# Patient Record
Sex: FEMALE | Race: WHITE | NOT HISPANIC OR LATINO | URBAN - METROPOLITAN AREA
[De-identification: names, ages, dates, MRNs, and addresses within clinical notes are randomized per-mention and may not be internally consistent; named-entity substitution may affect disease eponyms.]

---

## 2017-01-11 PROBLEM — Z00.00 ENCOUNTER FOR PREVENTIVE HEALTH EXAMINATION: Status: ACTIVE | Noted: 2017-01-11

## 2017-01-12 ENCOUNTER — OUTPATIENT (OUTPATIENT)
Dept: OUTPATIENT SERVICES | Facility: HOSPITAL | Age: 52
LOS: 1 days | Discharge: HOME | End: 2017-01-12

## 2017-01-12 ENCOUNTER — APPOINTMENT (OUTPATIENT)
Dept: HEMATOLOGY ONCOLOGY | Facility: CLINIC | Age: 52
End: 2017-01-12

## 2017-01-12 VITALS
BODY MASS INDEX: 32.58 KG/M2 | HEART RATE: 89 BPM | HEIGHT: 68 IN | TEMPERATURE: 98.2 F | WEIGHT: 215 LBS | RESPIRATION RATE: 16 BRPM | SYSTOLIC BLOOD PRESSURE: 137 MMHG | DIASTOLIC BLOOD PRESSURE: 64 MMHG

## 2017-01-12 DIAGNOSIS — Z86.39 PERSONAL HISTORY OF OTHER ENDOCRINE, NUTRITIONAL AND METABOLIC DISEASE: ICD-10-CM

## 2017-01-12 DIAGNOSIS — Z80.51 FAMILY HISTORY OF MALIGNANT NEOPLASM OF KIDNEY: ICD-10-CM

## 2017-01-12 DIAGNOSIS — Z80.8 FAMILY HISTORY OF MALIGNANT NEOPLASM OF OTHER ORGANS OR SYSTEMS: ICD-10-CM

## 2017-01-12 DIAGNOSIS — Z80.3 FAMILY HISTORY OF MALIGNANT NEOPLASM OF BREAST: ICD-10-CM

## 2017-01-12 DIAGNOSIS — Z87.19 PERSONAL HISTORY OF OTHER DISEASES OF THE DIGESTIVE SYSTEM: ICD-10-CM

## 2017-01-12 DIAGNOSIS — Z87.39 PERSONAL HISTORY OF OTHER DISEASES OF THE MUSCULOSKELETAL SYSTEM AND CONNECTIVE TISSUE: ICD-10-CM

## 2017-01-16 ENCOUNTER — OUTPATIENT (OUTPATIENT)
Dept: OUTPATIENT SERVICES | Facility: HOSPITAL | Age: 52
LOS: 1 days | Discharge: HOME | End: 2017-01-16

## 2017-01-24 LAB
ALBUMIN MFR SERPL ELPH: 57.5 %
ALBUMIN SERPL ELPH-MCNC: 4.6 G/DL
ALBUMIN SERPL-MCNC: 4.4 G/DL
ALBUMIN/GLOB SERPL ELPH: 1.4 ZZ
ALBUMIN/GLOB SERPL: 1.47
ALP SERPL-CCNC: 128 IU/L
ALPHA1 GLOB MFR SERPL ELPH: 3.3 %
ALPHA1 GLOB SERPL ELPH-MCNC: 0.3 G/DL
ALPHA2 GLOB MFR SERPL ELPH: 11.5 %
ALPHA2 GLOB SERPL ELPH-MCNC: 0.9 G/DL
ALT SERPL-CCNC: 46 IU/L
ANION GAP SERPL CALC-SCNC: 11 MEQ/L
AST SERPL-CCNC: 33 IU/L
B-GLOBULIN SERPL ELPH-MCNC: 0.9 G/DL
B2 MICROGLOB SERPL-MCNC: 2.2 MG/L
BASOPHILS # BLD: 0.08 TH/MM3
BASOPHILS NFR BLD: 0.6 %
BETA1 GLOB MFR SERPL ELPH: 11.1 %
BILIRUB SERPL-MCNC: 0.4 MG/DL
BUN SERPL-MCNC: 9 MG/DL
BUN/CREAT SERPL: 11.4 %
CALCIUM SERPL-MCNC: 9.6 MG/DL
CHLORIDE SERPL-SCNC: 102 MEQ/L
CO2 SERPL-SCNC: 28 MEQ/L
CREAT SERPL-MCNC: 0.79 MG/DL
EOSINOPHIL # BLD: 0.56 TH/MM3
EOSINOPHIL NFR BLD: 4.2 %
ERYTHROCYTE [DISTWIDTH] IN BLOOD BY AUTOMATED COUNT: 13.5 %
GAMMA GLOB MFR SERPL ELPH: 16.6 %
GAMMA GLOB SERPL ELPH-MCNC: 1.3 G/DL
GFR SERPL CREATININE-BSD FRML MDRD: 77
GLUCOSE SERPL-MCNC: 71 MG/DL
GRANULOCYTES # BLD: 5.13 TH/MM3
GRANULOCYTES NFR BLD: 38 %
HCT VFR BLD AUTO: 42.3 %
HGB BLD-MCNC: 13.9 G/DL
IGA FLD-MCNC: 240 MG/DL
IGG FLD-MCNC: 1450 MG/DL
IGM SERPL-MCNC: 64 MG/DL
IMM GRANULOCYTES # BLD: 0.08 TH/MM3
IMM GRANULOCYTES NFR BLD: 0.6 %
INTERPRETATION SERPL IFE-IMP: NORMAL
LACTATE DEHYDROGENASE (NORTH): 242 IU/L
LYMPHOCYTES # BLD: 6.89 TH/MM3
LYMPHOCYTES NFR BLD: 51.2 %
MCH RBC QN AUTO: 30.2 PG
MCHC RBC AUTO-ENTMCNC: 32.9 G/DL
MCV RBC AUTO: 92 FL
MONOCYTES # BLD: 0.73 TH/MM3
MONOCYTES NFR BLD: 5.4 %
PLATELET # BLD: 583 TH/MM3
PMV BLD AUTO: 9 FL
POTASSIUM SERPL-SCNC: 4.8 MMOL/L
PROT PATTERN SERPL ELPH-IMP: 8 G/DL
PROT PATTERN SERPL ELPH-IMP: NORMAL
PROT SERPL-MCNC: 7.4 G/DL
PROT SERPL-MCNC: 8 G/DL
RBC # BLD AUTO: 4.6 MIL/MM3
SODIUM SERPL-SCNC: 141 MEQ/L
WBC # BLD: 13.47 TH/MM3

## 2017-06-27 DIAGNOSIS — R10.2 PELVIC AND PERINEAL PAIN: ICD-10-CM

## 2017-06-29 ENCOUNTER — OUTPATIENT (OUTPATIENT)
Dept: OUTPATIENT SERVICES | Facility: HOSPITAL | Age: 52
LOS: 1 days | Discharge: HOME | End: 2017-06-29

## 2017-06-29 ENCOUNTER — APPOINTMENT (OUTPATIENT)
Dept: HEMATOLOGY ONCOLOGY | Facility: CLINIC | Age: 52
End: 2017-06-29

## 2017-06-29 VITALS
RESPIRATION RATE: 16 BRPM | HEART RATE: 68 BPM | WEIGHT: 200 LBS | SYSTOLIC BLOOD PRESSURE: 109 MMHG | DIASTOLIC BLOOD PRESSURE: 62 MMHG | HEIGHT: 68 IN | TEMPERATURE: 98.6 F | BODY MASS INDEX: 30.31 KG/M2

## 2017-06-30 DIAGNOSIS — C91.10 CHRONIC LYMPHOCYTIC LEUKEMIA OF B-CELL TYPE NOT HAVING ACHIEVED REMISSION: ICD-10-CM

## 2017-07-13 LAB
ANA TITR SER: NEGATIVE
BASOPHILS # BLD: 0.05 TH/MM3
BASOPHILS NFR BLD: 0.4 %
EOSINOPHIL # BLD: 0.4 TH/MM3
EOSINOPHIL NFR BLD: 2.9 %
ERYTHROCYTE [DISTWIDTH] IN BLOOD BY AUTOMATED COUNT: 12.9 %
FERRITIN SERPL-MCNC: 46 NG/ML
GRANULOCYTES # BLD: 5.26 TH/MM3
GRANULOCYTES NFR BLD: 37.5 %
HCT VFR BLD AUTO: 39.3 %
HGB BLD-MCNC: 12.8 G/DL
IMM GRANULOCYTES # BLD: 0.02 TH/MM3
IMM GRANULOCYTES NFR BLD: 0.1 %
IRON SERPL-MCNC: 50 UG/DL
JAK2 GENE MUT ANL BLD/T: NORMAL
LYMPHOCYTES # BLD: 7.56 TH/MM3
LYMPHOCYTES NFR BLD: 54 %
MCH RBC QN AUTO: 30 PG
MCHC RBC AUTO-ENTMCNC: 32.6 G/DL
MCV RBC AUTO: 92 FL
MONOCYTES # BLD: 0.72 TH/MM3
MONOCYTES NFR BLD: 5.1 %
PLATELET # BLD: 445 TH/MM3
PMV BLD AUTO: 8.6 FL
RBC # BLD AUTO: 4.27 MIL/MM3
RHEUMATOID FACT SERPL-ACNC: 79 IU/ML
TIBC SERPL-MCNC: 381 UG/DL
WBC # BLD: 14.01 TH/MM3

## 2017-09-06 ENCOUNTER — TRANSCRIPTION ENCOUNTER (OUTPATIENT)
Age: 52
End: 2017-09-06

## 2017-12-21 ENCOUNTER — APPOINTMENT (OUTPATIENT)
Dept: HEMATOLOGY ONCOLOGY | Facility: CLINIC | Age: 52
End: 2017-12-21

## 2017-12-21 ENCOUNTER — OUTPATIENT (OUTPATIENT)
Dept: OUTPATIENT SERVICES | Facility: HOSPITAL | Age: 52
LOS: 1 days | Discharge: HOME | End: 2017-12-21

## 2017-12-21 VITALS
HEART RATE: 74 BPM | RESPIRATION RATE: 18 BRPM | TEMPERATURE: 97.2 F | WEIGHT: 209 LBS | SYSTOLIC BLOOD PRESSURE: 126 MMHG | BODY MASS INDEX: 31.67 KG/M2 | DIASTOLIC BLOOD PRESSURE: 67 MMHG | HEIGHT: 68 IN

## 2017-12-22 DIAGNOSIS — C91.10 CHRONIC LYMPHOCYTIC LEUKEMIA OF B-CELL TYPE NOT HAVING ACHIEVED REMISSION: ICD-10-CM

## 2018-04-30 ENCOUNTER — OUTPATIENT (OUTPATIENT)
Dept: OUTPATIENT SERVICES | Facility: HOSPITAL | Age: 53
LOS: 1 days | Discharge: HOME | End: 2018-04-30

## 2018-04-30 ENCOUNTER — APPOINTMENT (OUTPATIENT)
Dept: HEMATOLOGY ONCOLOGY | Facility: CLINIC | Age: 53
End: 2018-04-30

## 2018-04-30 VITALS
TEMPERATURE: 97 F | WEIGHT: 212 LBS | RESPIRATION RATE: 16 BRPM | BODY MASS INDEX: 32.13 KG/M2 | DIASTOLIC BLOOD PRESSURE: 63 MMHG | SYSTOLIC BLOOD PRESSURE: 132 MMHG | HEART RATE: 78 BPM | HEIGHT: 68 IN

## 2018-05-23 DIAGNOSIS — C91.10 CHRONIC LYMPHOCYTIC LEUKEMIA OF B-CELL TYPE NOT HAVING ACHIEVED REMISSION: ICD-10-CM

## 2018-10-26 ENCOUNTER — OUTPATIENT (OUTPATIENT)
Dept: OUTPATIENT SERVICES | Facility: HOSPITAL | Age: 53
LOS: 1 days | Discharge: HOME | End: 2018-10-26

## 2018-10-26 ENCOUNTER — APPOINTMENT (OUTPATIENT)
Dept: HEMATOLOGY ONCOLOGY | Facility: CLINIC | Age: 53
End: 2018-10-26

## 2018-10-26 VITALS
TEMPERATURE: 96.7 F | DIASTOLIC BLOOD PRESSURE: 50 MMHG | BODY MASS INDEX: 32.13 KG/M2 | RESPIRATION RATE: 16 BRPM | HEART RATE: 70 BPM | WEIGHT: 212 LBS | SYSTOLIC BLOOD PRESSURE: 124 MMHG | HEIGHT: 68 IN

## 2018-10-29 DIAGNOSIS — C91.10 CHRONIC LYMPHOCYTIC LEUKEMIA OF B-CELL TYPE NOT HAVING ACHIEVED REMISSION: ICD-10-CM

## 2018-12-25 ENCOUNTER — EMERGENCY (EMERGENCY)
Facility: HOSPITAL | Age: 53
LOS: 0 days | Discharge: HOME | End: 2018-12-25
Attending: EMERGENCY MEDICINE | Admitting: EMERGENCY MEDICINE

## 2018-12-25 VITALS
OXYGEN SATURATION: 97 % | DIASTOLIC BLOOD PRESSURE: 68 MMHG | TEMPERATURE: 98 F | RESPIRATION RATE: 20 BRPM | HEART RATE: 88 BPM | SYSTOLIC BLOOD PRESSURE: 136 MMHG

## 2018-12-25 VITALS
HEART RATE: 68 BPM | SYSTOLIC BLOOD PRESSURE: 121 MMHG | OXYGEN SATURATION: 98 % | RESPIRATION RATE: 18 BRPM | TEMPERATURE: 99 F | DIASTOLIC BLOOD PRESSURE: 58 MMHG

## 2018-12-25 DIAGNOSIS — Z90.49 ACQUIRED ABSENCE OF OTHER SPECIFIED PARTS OF DIGESTIVE TRACT: ICD-10-CM

## 2018-12-25 DIAGNOSIS — R10.9 UNSPECIFIED ABDOMINAL PAIN: ICD-10-CM

## 2018-12-25 DIAGNOSIS — Z90.49 ACQUIRED ABSENCE OF OTHER SPECIFIED PARTS OF DIGESTIVE TRACT: Chronic | ICD-10-CM

## 2018-12-25 DIAGNOSIS — M54.9 DORSALGIA, UNSPECIFIED: ICD-10-CM

## 2018-12-25 LAB
ALBUMIN SERPL ELPH-MCNC: 4.5 G/DL — SIGNIFICANT CHANGE UP (ref 3.5–5.2)
ALP SERPL-CCNC: 125 U/L — HIGH (ref 30–115)
ALT FLD-CCNC: 45 U/L — HIGH (ref 0–41)
ANION GAP SERPL CALC-SCNC: 16 MMOL/L — HIGH (ref 7–14)
APPEARANCE UR: CLEAR — SIGNIFICANT CHANGE UP
AST SERPL-CCNC: 25 U/L — SIGNIFICANT CHANGE UP (ref 0–41)
BACTERIA # UR AUTO: ABNORMAL /HPF
BASOPHILS # BLD AUTO: 0 K/UL — SIGNIFICANT CHANGE UP (ref 0–0.2)
BASOPHILS NFR BLD AUTO: 0 % — SIGNIFICANT CHANGE UP (ref 0–1)
BILIRUB SERPL-MCNC: 0.6 MG/DL — SIGNIFICANT CHANGE UP (ref 0.2–1.2)
BILIRUB UR-MCNC: NEGATIVE — SIGNIFICANT CHANGE UP
BUN SERPL-MCNC: 10 MG/DL — SIGNIFICANT CHANGE UP (ref 10–20)
CALCIUM SERPL-MCNC: 9.5 MG/DL — SIGNIFICANT CHANGE UP (ref 8.5–10.1)
CHLORIDE SERPL-SCNC: 101 MMOL/L — SIGNIFICANT CHANGE UP (ref 98–110)
CO2 SERPL-SCNC: 23 MMOL/L — SIGNIFICANT CHANGE UP (ref 17–32)
COLOR SPEC: YELLOW — SIGNIFICANT CHANGE UP
CREAT SERPL-MCNC: 0.9 MG/DL — SIGNIFICANT CHANGE UP (ref 0.7–1.5)
D DIMER BLD IA.RAPID-MCNC: 201 NG/ML DDU — SIGNIFICANT CHANGE UP (ref 0–230)
DIFF PNL FLD: NEGATIVE — SIGNIFICANT CHANGE UP
EOSINOPHIL # BLD AUTO: 0.19 K/UL — SIGNIFICANT CHANGE UP (ref 0–0.7)
EOSINOPHIL NFR BLD AUTO: 1 % — SIGNIFICANT CHANGE UP (ref 0–8)
EPI CELLS # UR: ABNORMAL /HPF
GLUCOSE SERPL-MCNC: 96 MG/DL — SIGNIFICANT CHANGE UP (ref 70–99)
GLUCOSE UR QL: NEGATIVE MG/DL — SIGNIFICANT CHANGE UP
HCT VFR BLD CALC: 40 % — SIGNIFICANT CHANGE UP (ref 37–47)
HGB BLD-MCNC: 13.2 G/DL — SIGNIFICANT CHANGE UP (ref 12–16)
KETONES UR-MCNC: NEGATIVE — SIGNIFICANT CHANGE UP
LACTATE SERPL-SCNC: 1.4 MMOL/L — SIGNIFICANT CHANGE UP (ref 0.5–2.2)
LEUKOCYTE ESTERASE UR-ACNC: ABNORMAL
LYMPHOCYTES # BLD AUTO: 11.47 K/UL — HIGH (ref 1.2–3.4)
LYMPHOCYTES # BLD AUTO: 60 % — HIGH (ref 20.5–51.1)
MCHC RBC-ENTMCNC: 29.8 PG — SIGNIFICANT CHANGE UP (ref 27–31)
MCHC RBC-ENTMCNC: 33 G/DL — SIGNIFICANT CHANGE UP (ref 32–37)
MCV RBC AUTO: 90.3 FL — SIGNIFICANT CHANGE UP (ref 81–99)
MONOCYTES # BLD AUTO: 0.57 K/UL — SIGNIFICANT CHANGE UP (ref 0.1–0.6)
MONOCYTES NFR BLD AUTO: 3 % — SIGNIFICANT CHANGE UP (ref 1.7–9.3)
NEUTROPHILS # BLD AUTO: 6.88 K/UL — HIGH (ref 1.4–6.5)
NEUTROPHILS NFR BLD AUTO: 35 % — LOW (ref 42.2–75.2)
NEUTS BAND # BLD: 1 % — SIGNIFICANT CHANGE UP (ref 0–6)
NITRITE UR-MCNC: NEGATIVE — SIGNIFICANT CHANGE UP
NRBC # BLD: 0 /100 — SIGNIFICANT CHANGE UP (ref 0–0)
NRBC # BLD: SIGNIFICANT CHANGE UP /100 WBCS (ref 0–0)
PH UR: 8.5 — SIGNIFICANT CHANGE UP (ref 5–8)
PLAT MORPH BLD: NORMAL — SIGNIFICANT CHANGE UP
PLATELET # BLD AUTO: 381 K/UL — SIGNIFICANT CHANGE UP (ref 130–400)
POTASSIUM SERPL-MCNC: 4.8 MMOL/L — SIGNIFICANT CHANGE UP (ref 3.5–5)
POTASSIUM SERPL-SCNC: 4.8 MMOL/L — SIGNIFICANT CHANGE UP (ref 3.5–5)
PROT SERPL-MCNC: 7.6 G/DL — SIGNIFICANT CHANGE UP (ref 6–8)
PROT UR-MCNC: NEGATIVE MG/DL — SIGNIFICANT CHANGE UP
RBC # BLD: 4.43 M/UL — SIGNIFICANT CHANGE UP (ref 4.2–5.4)
RBC # FLD: 12.9 % — SIGNIFICANT CHANGE UP (ref 11.5–14.5)
RBC BLD AUTO: NORMAL — SIGNIFICANT CHANGE UP
RBC CASTS # UR COMP ASSIST: SIGNIFICANT CHANGE UP /HPF
SODIUM SERPL-SCNC: 140 MMOL/L — SIGNIFICANT CHANGE UP (ref 135–146)
SP GR SPEC: 1.01 — SIGNIFICANT CHANGE UP (ref 1.01–1.03)
UROBILINOGEN FLD QL: 0.2 MG/DL — SIGNIFICANT CHANGE UP (ref 0.2–0.2)
WBC # BLD: 19.11 K/UL — HIGH (ref 4.8–10.8)
WBC # FLD AUTO: 19.11 K/UL — HIGH (ref 4.8–10.8)
WBC UR QL: SIGNIFICANT CHANGE UP /HPF

## 2018-12-25 RX ORDER — SODIUM CHLORIDE 9 MG/ML
1000 INJECTION INTRAMUSCULAR; INTRAVENOUS; SUBCUTANEOUS ONCE
Qty: 0 | Refills: 0 | Status: COMPLETED | OUTPATIENT
Start: 2018-12-25 | End: 2018-12-25

## 2018-12-25 RX ORDER — MORPHINE SULFATE 50 MG/1
4 CAPSULE, EXTENDED RELEASE ORAL ONCE
Qty: 0 | Refills: 0 | Status: DISCONTINUED | OUTPATIENT
Start: 2018-12-25 | End: 2018-12-25

## 2018-12-25 RX ORDER — METHOCARBAMOL 500 MG/1
2 TABLET, FILM COATED ORAL
Qty: 30 | Refills: 0 | OUTPATIENT
Start: 2018-12-25 | End: 2018-12-29

## 2018-12-25 RX ORDER — KETOROLAC TROMETHAMINE 30 MG/ML
30 SYRINGE (ML) INJECTION ONCE
Qty: 0 | Refills: 0 | Status: DISCONTINUED | OUTPATIENT
Start: 2018-12-25 | End: 2018-12-25

## 2018-12-25 RX ADMIN — SODIUM CHLORIDE 2400 MILLILITER(S): 9 INJECTION INTRAMUSCULAR; INTRAVENOUS; SUBCUTANEOUS at 13:29

## 2018-12-25 RX ADMIN — MORPHINE SULFATE 4 MILLIGRAM(S): 50 CAPSULE, EXTENDED RELEASE ORAL at 13:50

## 2018-12-25 RX ADMIN — Medication 30 MILLIGRAM(S): at 16:47

## 2018-12-25 RX ADMIN — Medication 30 MILLIGRAM(S): at 17:00

## 2018-12-25 RX ADMIN — MORPHINE SULFATE 4 MILLIGRAM(S): 50 CAPSULE, EXTENDED RELEASE ORAL at 14:54

## 2018-12-25 RX ADMIN — MORPHINE SULFATE 4 MILLIGRAM(S): 50 CAPSULE, EXTENDED RELEASE ORAL at 15:15

## 2018-12-25 RX ADMIN — MORPHINE SULFATE 4 MILLIGRAM(S): 50 CAPSULE, EXTENDED RELEASE ORAL at 13:29

## 2018-12-25 NOTE — ED ADULT NURSE NOTE - NSIMPLEMENTINTERV_GEN_ALL_ED
Implemented All Universal Safety Interventions:  [place] to call system. Call bell, personal items and telephone within reach. Instruct patient to call for assistance. Room bathroom lighting operational. Non-slip footwear when patient is off stretcher. Physically safe environment: no spills, clutter or unnecessary equipment. Stretcher in lowest position, wheels locked, appropriate side rails in place.

## 2018-12-25 NOTE — ED PROVIDER NOTE - NS ED ROS FT
Constitutional: See HPI. No fever/chills.  Neck: No neck pain or stiffness.  Cardiac: No chest pain, SOB or edema. No chest pain with exertion.  Respiratory: No cough or respiratory distress. No hemoptysis.   GI: No nausea, vomiting, diarrhea or abdominal pain.  : No dysuria, frequency or burning.   MS: No myalgia, muscle weakness, joint pain. (+) back pain.  Neuro: No headache or weakness. No LOC.  Skin: No rash.  Except as documented in the HPI, all other systems are negative.

## 2018-12-25 NOTE — ED PROVIDER NOTE - OBJECTIVE STATEMENT
52 y/o F with PMH of CLL and cholecystectomy , presents c/o R sided back pain x 2 days. Back pain is constant, pt feels a burning sensation. Denies any urinary SX. Has not noticed a color change in urine. Pt took Motrin last night that provided temporary relief. Pt has also been using heat packs with minimal relief. recently received a flu shot.

## 2018-12-25 NOTE — ED PROVIDER NOTE - PHYSICAL EXAMINATION
CON: ao x 3, uncomftorable appearing,  HENMT: clear oropharynx,  neck supple,  CV: rrr, equal pulses b/l, RESP: cta b/l, GI:  soft, nontender, no rebound, no guarding, SKIN: no rash, MSK: neg SLR  BL, pain worse with mvt, + right cva ttp NEURO: no gross motor or sensory deficit Psychiatric: appropriate mood, appropriate affect

## 2018-12-25 NOTE — ED ADULT NURSE NOTE - CHPI ED NUR SYMPTOMS NEG
no fever/no abdominal distension/no blood in stool/no chills/no dysuria/no nausea/no hematuria/no diarrhea

## 2018-12-25 NOTE — ED PROVIDER NOTE - PROGRESS NOTE DETAILS
Labs reviewed with pt. Pain is reproducible to back, now further questioning. Pain is worse with movement. WC noted to be elevated, however pt has ha PMH of CLL. Instructed to follow up with PMD for this. Will d/c home with muscle relaxer.

## 2018-12-26 LAB
CULTURE RESULTS: SIGNIFICANT CHANGE UP
SPECIMEN SOURCE: SIGNIFICANT CHANGE UP

## 2019-04-09 ENCOUNTER — TRANSCRIPTION ENCOUNTER (OUTPATIENT)
Age: 54
End: 2019-04-09

## 2019-04-26 ENCOUNTER — OUTPATIENT (OUTPATIENT)
Dept: OUTPATIENT SERVICES | Facility: HOSPITAL | Age: 54
LOS: 1 days | Discharge: HOME | End: 2019-04-26

## 2019-04-26 ENCOUNTER — APPOINTMENT (OUTPATIENT)
Dept: HEMATOLOGY ONCOLOGY | Facility: CLINIC | Age: 54
End: 2019-04-26

## 2019-04-26 VITALS
DIASTOLIC BLOOD PRESSURE: 77 MMHG | WEIGHT: 216 LBS | BODY MASS INDEX: 32.74 KG/M2 | HEART RATE: 74 BPM | TEMPERATURE: 97.5 F | HEIGHT: 68 IN | SYSTOLIC BLOOD PRESSURE: 151 MMHG

## 2019-04-26 DIAGNOSIS — Z90.49 ACQUIRED ABSENCE OF OTHER SPECIFIED PARTS OF DIGESTIVE TRACT: Chronic | ICD-10-CM

## 2019-04-26 PROBLEM — C91.90 LYMPHOID LEUKEMIA, UNSPECIFIED NOT HAVING ACHIEVED REMISSION: Chronic | Status: ACTIVE | Noted: 2018-12-25

## 2019-04-26 RX ORDER — ESOMEPRAZOLE MAGNESIUM 40 MG/1
40 CAPSULE, DELAYED RELEASE ORAL
Refills: 0 | Status: ACTIVE | COMMUNITY

## 2019-04-26 RX ORDER — LEVOTHYROXINE SODIUM 0.14 MG/1
137 TABLET ORAL
Refills: 0 | Status: ACTIVE | COMMUNITY

## 2019-04-27 NOTE — HISTORY OF PRESENT ILLNESS
[de-identified] : 52-year-old female is here today for 6 months f/u visit. She has chronic lymphocytic leukemia. She saw Dr. Wesly Barr and had a blood workup in 12/2016.  CBC shows WBC 13.4 with 54% lymphocytes and absolute lymphocyte counts of 7276.  Hemoglobin was 13.3 g/dL and platelet counts 431. Her electrolytes and renal function were within normal limits.  On 12/29/16, flow cytometry of peripheral blood performed at [facility] lab revealed small mature lymphocytes positive for CD5, CD19, CD20 (dim), CD23, HLL-DR, CD11c and surface lambda light chain restriction (dim signal). These B cells were negative for CD10 and CD38. 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  On January 4, 2016, she [facility] of the abdomen and [facility], which showed fatty liver. The spleen was not enlarged. The patient has no fever, night sweats, weight loss, or poor appetite.\par \par Blood work performed during her last visit showed mild lymphocytosis, normal Hb and elevated PLT > 500. SPEP/OUSMANE was negative for monoclonal protein. Quantitative IgG, IgA and IgM were within normal ranges. \par \par Today, she report she is feeling better , no longer on Prednisone and  Methotrexate  patient had rash with Methotrexate . On 3/20/18 patient had Ct of Abdomen and pelvis which reveal small Lymph node in the region of the celiac axis and also retroperitoneal area . largest lymph node measure 14 mm on the left and 11 mm on the right.On 3/27/18 patient had blood work we reviewed all blood result .  \par Also patient report mild right side back pain , she will follow up with PMD . Patient ambulating fully independent \par \par Review of systems is otherwise negative.\par \par \par \par \par \par \par \par \par \par \par \par Review of systems is otherwise negative [de-identified] : 10/26/18;\par The patient is here for followup visit. She has CLL and has been kept on observation. She feels fatigue but no fever, night sweat or weight loss. She had blood work on 10/19/18. CBC showed stable lymphocytosis.\par \par 4/26/19: \par Pt returns for f/u visit. No fresh complaints. Denies having fever, night sweats, weight loss, pruritus or LUQ pain. No new lumps or masses. Her CBC showed mild increase in WBC from 16K to 18K. Lymphocytic count increased from 10K to 13K. HB and plts wnl.

## 2019-04-27 NOTE — ASSESSMENT
[FreeTextEntry1] : 1. Chronic lymphocytic leukemia with mild lymphocytosis, asymptomatic.\par 2. Thrombocytosis, reactive.\par \par Recommendation: \par - Repeat blood work reviewed with the patient. Her CBC showed mild increase in WBC from 16K to 18K. Lymphocytic count increased from 10K to 13K. HB and plts wnl.\par - Continue observation.\par - Followup visit here in 6 months.

## 2019-04-27 NOTE — PHYSICAL EXAM
[Fully active, able to carry on all pre-disease performance without restriction] : Status 0 - Fully active, able to carry on all pre-disease performance without restriction [Normal] : affect appropriate [de-identified] : There is no palpable cervical lymphadenopathy.

## 2019-04-30 DIAGNOSIS — C91.10 CHRONIC LYMPHOCYTIC LEUKEMIA OF B-CELL TYPE NOT HAVING ACHIEVED REMISSION: ICD-10-CM

## 2019-06-13 ENCOUNTER — EMERGENCY (EMERGENCY)
Facility: HOSPITAL | Age: 54
LOS: 0 days | Discharge: HOME | End: 2019-06-13
Admitting: EMERGENCY MEDICINE
Payer: COMMERCIAL

## 2019-06-13 VITALS
DIASTOLIC BLOOD PRESSURE: 77 MMHG | OXYGEN SATURATION: 97 % | RESPIRATION RATE: 20 BRPM | HEART RATE: 67 BPM | TEMPERATURE: 97 F | SYSTOLIC BLOOD PRESSURE: 163 MMHG

## 2019-06-13 VITALS — HEIGHT: 67 IN | WEIGHT: 220.02 LBS

## 2019-06-13 DIAGNOSIS — Z88.0 ALLERGY STATUS TO PENICILLIN: ICD-10-CM

## 2019-06-13 DIAGNOSIS — Z90.49 ACQUIRED ABSENCE OF OTHER SPECIFIED PARTS OF DIGESTIVE TRACT: Chronic | ICD-10-CM

## 2019-06-13 DIAGNOSIS — K08.89 OTHER SPECIFIED DISORDERS OF TEETH AND SUPPORTING STRUCTURES: ICD-10-CM

## 2019-06-13 DIAGNOSIS — Z79.899 OTHER LONG TERM (CURRENT) DRUG THERAPY: ICD-10-CM

## 2019-06-13 PROCEDURE — 99283 EMERGENCY DEPT VISIT LOW MDM: CPT

## 2019-06-13 RX ADMIN — Medication 300 MILLIGRAM(S): at 20:52

## 2019-06-13 NOTE — ED PROVIDER NOTE - ENMT, MLM
Airway patent, Nasal mucosa clear. Mouth with normal mucosa. Throat has no vesicles, no oropharyngeal exudates and uvula is midline. (+) tenderness percussion tooth #12

## 2019-06-13 NOTE — ED ADULT NURSE NOTE - OBJECTIVE STATEMENT
Pt reports left side dental pain. Denies nausea, vomiting, and fever. Pt report takin ibuprofen and tylenol for pain control

## 2019-06-13 NOTE — ED PROVIDER NOTE - OBJECTIVE STATEMENT
54 y.o. female presented to the ER c/o Left upper tooth ache since this AM.  Pt denies fever, chills, dysphagia, SOB. No other complaints. 54 y.o. female with a PMH of CLL and hypothyroidism presented to the ER c/o Left upper tooth ache since this AM.  Pt denies fever, chills, dysphagia, SOB. No other complaints.

## 2019-06-13 NOTE — ED PROVIDER NOTE - NSFOLLOWUPINSTRUCTIONS_ED_ALL_ED_FT
Dental Pain  ImageDental pain may be caused by many things, including:  Tooth decay (cavities or caries).  Infection.  The inner part of the tooth being filled with pus (abscess).  Injury.  Sometimes the cause of pain is unknown.    Your pain can vary. It may be mild or severe. You may have it all the time, or it may occur only when you are:  Chewing.  Exposed to hot or cold temperature.  Eating or drinking sugary foods or beverages, such as soda or candy.  Follow these instructions at home:  Medicines     Take over-the-counter and prescription medicines only as told by your doctor.  If you were prescribed an antibiotic medicine, take it as told by your doctor. Do not stop taking the medicine even if you start to feel better.  Eating and drinking     Do not eat foods or drinks that cause you pain. These include:  Very hot or very cold foods or drinks.  Sweet or sugary foods or drinks.  Managing pain and swelling     Image   Gargle with a salt-water mixture 3–4 times a day. To make this, dissolve ½–1 tsp of salt in 1 cup of warm water.  If told, put ice on the painful area of your face:  Put ice in a plastic bag.   Place a towel between your skin and the bag.   Leave the ice on for 20 minutes, 2–3 times a day.  Brushing your teeth     Brush your teeth twice a day using a fluoride toothpaste.   Floss your teeth once a day.  Use a toothpaste made for sensitive teeth as told by your doctor.  Use a soft toothbrush.  General instructions     Do not apply heat to the outside of your face.  Watch your dental pain. Let your doctor know if there are any changes.  Keep all follow-up visits as told by your doctor. This is important.  Contact a doctor if:  Your pain is not relieved by medicines.  You have new symptoms.  Your symptoms get worse.  Get help right away if:  You cannot open your mouth.  You are having trouble breathing or swallowing.  You have a fever.  Your face, neck, or jaw is swollen.  Summary  Dental pain may be caused by many things, including tooth decay, injury, or infection. In some cases, the cause is not known.  Your pain may be mild or severe. You may have pain all the time, or you may have it only when you eat or drink.  Take over-the-counter and prescription medicines only as told by your doctor.  Watch your dental pain for any changes. Let your doctor know if symptoms get worse.  This information is not intended to replace advice given to you by your health care provider. Make sure you discuss any questions you have with your health care provider.    Document Released: 06/05/2009 Document Revised: 12/31/2018 Document Reviewed: 12/31/2018  [employer] Interactive Patient Education © 2019 [employer]

## 2019-11-11 ENCOUNTER — APPOINTMENT (OUTPATIENT)
Dept: HEMATOLOGY ONCOLOGY | Facility: CLINIC | Age: 54
End: 2019-11-11
Payer: COMMERCIAL

## 2019-11-11 ENCOUNTER — OUTPATIENT (OUTPATIENT)
Dept: OUTPATIENT SERVICES | Facility: HOSPITAL | Age: 54
LOS: 1 days | Discharge: HOME | End: 2019-11-11

## 2019-11-11 VITALS
HEART RATE: 66 BPM | DIASTOLIC BLOOD PRESSURE: 69 MMHG | WEIGHT: 210 LBS | HEIGHT: 68 IN | TEMPERATURE: 97.1 F | BODY MASS INDEX: 31.83 KG/M2 | SYSTOLIC BLOOD PRESSURE: 113 MMHG | RESPIRATION RATE: 14 BRPM

## 2019-11-11 DIAGNOSIS — Z90.49 ACQUIRED ABSENCE OF OTHER SPECIFIED PARTS OF DIGESTIVE TRACT: Chronic | ICD-10-CM

## 2019-11-11 PROBLEM — K21.9 GASTRO-ESOPHAGEAL REFLUX DISEASE WITHOUT ESOPHAGITIS: Chronic | Status: ACTIVE | Noted: 2019-06-13

## 2019-11-11 PROBLEM — E03.9 HYPOTHYROIDISM, UNSPECIFIED: Chronic | Status: ACTIVE | Noted: 2019-06-13

## 2019-11-11 PROCEDURE — 99213 OFFICE O/P EST LOW 20 MIN: CPT

## 2019-11-13 NOTE — ASSESSMENT
[FreeTextEntry1] : 1. Chronic lymphocytic leukemia with mild lymphocytosis, asymptomatic.\par 2. Thrombocytosis, reactive.\par \par Recommendation: \par - Repeat blood work reviewed with the patient She has stable lymphocytosis with normal Hb and PLT, and is asymptomatic. \par - Continue observation.\par -- Repeat blod work in 6 months.\par - RTC in 6 months. \par \par Case was seen and discussed with Dr. Brunson  who agreed with the assessment and plan.\par

## 2019-11-13 NOTE — PHYSICAL EXAM
[Fully active, able to carry on all pre-disease performance without restriction] : Status 0 - Fully active, able to carry on all pre-disease performance without restriction [Normal] : affect appropriate [de-identified] : There is no palpable cervical lymphadenopathy.

## 2019-11-14 DIAGNOSIS — C91.10 CHRONIC LYMPHOCYTIC LEUKEMIA OF B-CELL TYPE NOT HAVING ACHIEVED REMISSION: ICD-10-CM

## 2019-12-18 ENCOUNTER — EMERGENCY (EMERGENCY)
Facility: HOSPITAL | Age: 54
LOS: 0 days | Discharge: HOME | End: 2019-12-18
Attending: EMERGENCY MEDICINE | Admitting: EMERGENCY MEDICINE
Payer: COMMERCIAL

## 2019-12-18 VITALS — HEIGHT: 67.5 IN | WEIGHT: 210.1 LBS

## 2019-12-18 VITALS
RESPIRATION RATE: 18 BRPM | SYSTOLIC BLOOD PRESSURE: 177 MMHG | HEART RATE: 91 BPM | OXYGEN SATURATION: 100 % | DIASTOLIC BLOOD PRESSURE: 81 MMHG | TEMPERATURE: 96 F

## 2019-12-18 DIAGNOSIS — R10.13 EPIGASTRIC PAIN: ICD-10-CM

## 2019-12-18 DIAGNOSIS — R07.9 CHEST PAIN, UNSPECIFIED: ICD-10-CM

## 2019-12-18 DIAGNOSIS — R07.89 OTHER CHEST PAIN: ICD-10-CM

## 2019-12-18 DIAGNOSIS — Z88.0 ALLERGY STATUS TO PENICILLIN: ICD-10-CM

## 2019-12-18 DIAGNOSIS — R11.2 NAUSEA WITH VOMITING, UNSPECIFIED: ICD-10-CM

## 2019-12-18 DIAGNOSIS — F17.200 NICOTINE DEPENDENCE, UNSPECIFIED, UNCOMPLICATED: ICD-10-CM

## 2019-12-18 DIAGNOSIS — Z90.49 ACQUIRED ABSENCE OF OTHER SPECIFIED PARTS OF DIGESTIVE TRACT: Chronic | ICD-10-CM

## 2019-12-18 LAB
ALBUMIN SERPL ELPH-MCNC: 4.8 G/DL — SIGNIFICANT CHANGE UP (ref 3.5–5.2)
ALP SERPL-CCNC: 128 U/L — HIGH (ref 30–115)
ALT FLD-CCNC: 31 U/L — SIGNIFICANT CHANGE UP (ref 0–41)
ANION GAP SERPL CALC-SCNC: 15 MMOL/L — HIGH (ref 7–14)
APTT BLD: 27.4 SEC — SIGNIFICANT CHANGE UP (ref 27–39.2)
AST SERPL-CCNC: 23 U/L — SIGNIFICANT CHANGE UP (ref 0–41)
BASOPHILS # BLD AUTO: 0.36 K/UL — HIGH (ref 0–0.2)
BASOPHILS NFR BLD AUTO: 1.8 % — HIGH (ref 0–1)
BILIRUB DIRECT SERPL-MCNC: <0.2 MG/DL — SIGNIFICANT CHANGE UP (ref 0–0.2)
BILIRUB INDIRECT FLD-MCNC: >0.1 MG/DL — LOW (ref 0.2–1.2)
BILIRUB SERPL-MCNC: 0.3 MG/DL — SIGNIFICANT CHANGE UP (ref 0.2–1.2)
BUN SERPL-MCNC: 16 MG/DL — SIGNIFICANT CHANGE UP (ref 10–20)
CALCIUM SERPL-MCNC: 9.8 MG/DL — SIGNIFICANT CHANGE UP (ref 8.5–10.1)
CHLORIDE SERPL-SCNC: 103 MMOL/L — SIGNIFICANT CHANGE UP (ref 98–110)
CO2 SERPL-SCNC: 25 MMOL/L — SIGNIFICANT CHANGE UP (ref 17–32)
CREAT SERPL-MCNC: 0.8 MG/DL — SIGNIFICANT CHANGE UP (ref 0.7–1.5)
EOSINOPHIL # BLD AUTO: 0 K/UL — SIGNIFICANT CHANGE UP (ref 0–0.7)
EOSINOPHIL NFR BLD AUTO: 0 % — SIGNIFICANT CHANGE UP (ref 0–8)
GLUCOSE SERPL-MCNC: 86 MG/DL — SIGNIFICANT CHANGE UP (ref 70–99)
HCT VFR BLD CALC: 40.2 % — SIGNIFICANT CHANGE UP (ref 37–47)
HGB BLD-MCNC: 12.9 G/DL — SIGNIFICANT CHANGE UP (ref 12–16)
INR BLD: 0.92 RATIO — SIGNIFICANT CHANGE UP (ref 0.65–1.3)
LACTATE SERPL-SCNC: 1.1 MMOL/L — SIGNIFICANT CHANGE UP (ref 0.7–2)
LIDOCAIN IGE QN: 37 U/L — SIGNIFICANT CHANGE UP (ref 7–60)
LYMPHOCYTES # BLD AUTO: 11.59 K/UL — HIGH (ref 1.2–3.4)
LYMPHOCYTES # BLD AUTO: 57.8 % — HIGH (ref 20.5–51.1)
MANUAL SMEAR VERIFICATION: SIGNIFICANT CHANGE UP
MCHC RBC-ENTMCNC: 29.7 PG — SIGNIFICANT CHANGE UP (ref 27–31)
MCHC RBC-ENTMCNC: 32.1 G/DL — SIGNIFICANT CHANGE UP (ref 32–37)
MCV RBC AUTO: 92.6 FL — SIGNIFICANT CHANGE UP (ref 81–99)
MONOCYTES # BLD AUTO: 0.56 K/UL — SIGNIFICANT CHANGE UP (ref 0.1–0.6)
MONOCYTES NFR BLD AUTO: 2.8 % — SIGNIFICANT CHANGE UP (ref 1.7–9.3)
NEUTROPHILS # BLD AUTO: 6.62 K/UL — HIGH (ref 1.4–6.5)
NEUTROPHILS NFR BLD AUTO: 33 % — LOW (ref 42.2–75.2)
PLAT MORPH BLD: NORMAL — SIGNIFICANT CHANGE UP
PLATELET # BLD AUTO: 374 K/UL — SIGNIFICANT CHANGE UP (ref 130–400)
POTASSIUM SERPL-MCNC: 4.5 MMOL/L — SIGNIFICANT CHANGE UP (ref 3.5–5)
POTASSIUM SERPL-SCNC: 4.5 MMOL/L — SIGNIFICANT CHANGE UP (ref 3.5–5)
PROT SERPL-MCNC: 7.6 G/DL — SIGNIFICANT CHANGE UP (ref 6–8)
PROTHROM AB SERPL-ACNC: 10.6 SEC — SIGNIFICANT CHANGE UP (ref 9.95–12.87)
RBC # BLD: 4.34 M/UL — SIGNIFICANT CHANGE UP (ref 4.2–5.4)
RBC # FLD: 12.8 % — SIGNIFICANT CHANGE UP (ref 11.5–14.5)
RBC BLD AUTO: NORMAL — SIGNIFICANT CHANGE UP
SMUDGE CELLS # BLD: PRESENT — SIGNIFICANT CHANGE UP
SODIUM SERPL-SCNC: 143 MMOL/L — SIGNIFICANT CHANGE UP (ref 135–146)
TROPONIN T SERPL-MCNC: <0.01 NG/ML — SIGNIFICANT CHANGE UP
VARIANT LYMPHS # BLD: 4.6 % — SIGNIFICANT CHANGE UP (ref 0–5)
WBC # BLD: 20.05 K/UL — HIGH (ref 4.8–10.8)
WBC # FLD AUTO: 20.05 K/UL — HIGH (ref 4.8–10.8)

## 2019-12-18 PROCEDURE — 71046 X-RAY EXAM CHEST 2 VIEWS: CPT | Mod: 26

## 2019-12-18 PROCEDURE — 93010 ELECTROCARDIOGRAM REPORT: CPT

## 2019-12-18 PROCEDURE — 99284 EMERGENCY DEPT VISIT MOD MDM: CPT

## 2019-12-18 RX ORDER — ONDANSETRON 8 MG/1
4 TABLET, FILM COATED ORAL ONCE
Refills: 0 | Status: COMPLETED | OUTPATIENT
Start: 2019-12-18 | End: 2019-12-18

## 2019-12-18 RX ORDER — FAMOTIDINE 10 MG/ML
20 INJECTION INTRAVENOUS ONCE
Refills: 0 | Status: COMPLETED | OUTPATIENT
Start: 2019-12-18 | End: 2019-12-18

## 2019-12-18 RX ORDER — SODIUM CHLORIDE 9 MG/ML
1000 INJECTION INTRAMUSCULAR; INTRAVENOUS; SUBCUTANEOUS ONCE
Refills: 0 | Status: COMPLETED | OUTPATIENT
Start: 2019-12-18 | End: 2019-12-18

## 2019-12-18 RX ADMIN — SODIUM CHLORIDE 1000 MILLILITER(S): 9 INJECTION INTRAMUSCULAR; INTRAVENOUS; SUBCUTANEOUS at 04:00

## 2019-12-18 RX ADMIN — ONDANSETRON 4 MILLIGRAM(S): 8 TABLET, FILM COATED ORAL at 04:09

## 2019-12-18 RX ADMIN — FAMOTIDINE 104 MILLIGRAM(S): 10 INJECTION INTRAVENOUS at 04:00

## 2019-12-18 NOTE — ED PROVIDER NOTE - OBJECTIVE STATEMENT
55 yo F with PMHx of GERD and CLL presents to the ED c/o mild epigastric pain that radiates up into her chest and into her back. Pain woke her up from sleep a few hours ago, pt stood up and felt better. She went back to sleep but then pain woke her up again so she came to ED. She had associated nausea and one episode of NBNB vomiting. She denies other complaints. She denies aggravating/alleviating factors. Pt denies fever, chills, vomiting, diarrhea, headache, dizziness, weakness, SOB, back pain, LOC, trauma, urinary symptoms, cough, calf pain/swelling, recent travel, recent surgery.

## 2019-12-18 NOTE — ED PROVIDER NOTE - NS ED ROS FT
Review of Systems  Constitutional:  No fever, chills.  Eyes:  No visual changes, eye pain, or discharge.  ENMT:  No hearing changes, pain, or discharge. No nasal congestion, discharge, or bleeding. No throat pain, swelling, or difficulty swallowing.  Cardiac:  No palpitations, syncope, or edema. (+) chest pain  Respiratory:  No dyspnea, cough. No hemoptysis.  GI:  No diarrhea, or abdominal pain. (+) nausea, vomiting   :  No dysuria, hematuria, frequency, or burning.   MS:  No back pain.  Skin:  No skin rash, pruritis, jaundice, or lesions.  Neuro:  No headache, dizziness, loss of sensation, or focal weakness.  No change in mental status.   Endocrine: No history of thyroid disease or diabetes.

## 2019-12-18 NOTE — ED PROVIDER NOTE - PATIENT PORTAL LINK FT
You can access the FollowMyHealth Patient Portal offered by [facility] by registering at the following website: [email or web address]. By joining [employer]’s FollowMyHealth portal, you will also be able to view your health information using other applications (apps) compatible with our system.

## 2019-12-18 NOTE — ED PROVIDER NOTE - NSFOLLOWUPINSTRUCTIONS_ED_ALL_ED_FT
Chest Pain    Chest pain can be caused by many different conditions which may or may not be dangerous. Causes include heartburn, lung infections, heart attack, blood clot in lungs, skin infections, strain or damage to muscle, cartilage, or bones, etc. In addition to a history and physical examination, an electrocardiogram (ECG) or other lab tests may have been performed to determine the cause of your chest pain. Follow up with your primary care provider or with a cardiologist as instructed.     SEEK IMMEDIATE MEDICAL CARE IF YOU HAVE ANY OF THE FOLLOWING SYMPTOMS: worsening chest pain, coughing up blood, unexplained back/neck/jaw pain, severe abdominal pain, dizziness or lightheadedness, fainting, shortness of breath, sweaty or clammy skin, vomiting, or racing heart beat. These symptoms may represent a serious problem that is an emergency. Do not wait to see if the symptoms will go away. Get medical help right away. Call 911 and do not drive yourself to the hospital.      Abdominal Pain    Many things can cause abdominal pain. Many times, abdominal pain is not caused by a disease and will improve without treatment. Your health care provider will do a physical exam to determine if there is a dangerous cause of your pain; blood tests and imaging may help determine the cause of your pain. However, in many cases, no cause may be found and you may need further testing as an outpatient. Monitor your abdominal pain for any changes.     SEEK IMMEDIATE MEDICAL CARE IF YOU HAVE ANY OF THE FOLLOWING SYMPTOMS: worsening abdominal pain, uncontrollable vomiting, profuse diarrhea, inability to have bowel movements or pass gas, black or bloody stools, fever accompanying chest pain or back pain, or fainting. These symptoms may represent a serious problem that is an emergency. Do not wait to see if the symptoms will go away. Get medical help right away. Call 911 and do not drive yourself to the hospital.

## 2019-12-18 NOTE — ED PROVIDER NOTE - CLINICAL SUMMARY MEDICAL DECISION MAKING FREE TEXT BOX
Patient pain free and PO tolerant. Labs and EKG normal.    Likely GERD/gastritis -- advised patient on return precautions, PMD follow up, bland diet.

## 2019-12-18 NOTE — ED ADULT TRIAGE NOTE - CHIEF COMPLAINT QUOTE
I woke up from sleep with a heaviness here and burning ( epigastric/mid sternal) ,  and it goes to the back - patient

## 2019-12-18 NOTE — ED PROVIDER NOTE - PHYSICAL EXAMINATION
VITAL SIGNS: I have reviewed nursing notes and confirm.  CONSTITUTIONAL: Well-developed; well-nourished; in no acute distress.  SKIN: Skin exam is warm and dry, no acute rash.  HEAD: Normocephalic; atraumatic.  EYES: Conjunctiva and sclera clear.  ENT: No nasal discharge; airway clear.   CARD: S1, S2 normal; no murmurs, gallops, or rubs. Regular rate and rhythm.  RESP: No wheezes, rales or rhonchi. Speaking in full sentences.   ABD: Normal bowel sounds; soft; non-distended; non-tender; No rebound or guarding. No CVA tenderness.  EXT: Normal ROM. No clubbing, cyanosis or edema. No calf TTP or swelling.   NEURO: Alert, oriented. Grossly unremarkable. No focal deficits.

## 2019-12-18 NOTE — ED ADULT NURSE NOTE - OBJECTIVE STATEMENT
pt is a 53 yo female pw burning and heaviness in midsternal/epigastric. ( epigastric/mid sternal) radiating around to back. denies SOB, recent travel, nausea, vomiting, diarrhea, diaphoresis, headache, dizziness, loc, cough, fever, trauma, numbness, tingling, urinary symptoms.

## 2019-12-18 NOTE — ED PROVIDER NOTE - NSFOLLOWUPCLINICS_GEN_ALL_ED_FT
[facility] [address] [address]  Phone: [phone]  Fax:   Follow Up Time: 4-6 Days

## 2019-12-18 NOTE — ED PROVIDER NOTE - ATTENDING CONTRIBUTION TO CARE
53 yo F, history of CLL and GERD, here for assessment of epigastric pain and vomiting. Patient woke with burning mid epigastric pain, radiating to chest and back. First episode resolved spontaneously, however had recurrent episode with vomiting x 1, non bloody, non bilious, prompting ED visit. No dizziness, dyspnea, diaphoresis.     No strong FH of CAD, no hx of sudden cardiac death. Reports sx are typical of GERD in all manners except radiation to back.    VS notable for elevated BP. On exam patient is well appearing. She has clear lungs, RRR, soft, ND abdomen with mild epigastric tenderness, no RUQ tenderness, no CVA tenderness. No rash, LE swelling.    Suspect GERD, however given associated CP will get labs, trop and reassess. Will treat GERD sx and nausea with gi cocktail.

## 2020-07-01 ENCOUNTER — APPOINTMENT (OUTPATIENT)
Dept: HEMATOLOGY ONCOLOGY | Facility: CLINIC | Age: 55
End: 2020-07-01
Payer: MEDICAID

## 2020-07-01 ENCOUNTER — LABORATORY RESULT (OUTPATIENT)
Age: 55
End: 2020-07-01

## 2020-07-01 ENCOUNTER — OUTPATIENT (OUTPATIENT)
Dept: OUTPATIENT SERVICES | Facility: HOSPITAL | Age: 55
LOS: 1 days | Discharge: HOME | End: 2020-07-01

## 2020-07-01 VITALS
BODY MASS INDEX: 31.83 KG/M2 | DIASTOLIC BLOOD PRESSURE: 61 MMHG | SYSTOLIC BLOOD PRESSURE: 134 MMHG | HEIGHT: 68 IN | TEMPERATURE: 97.2 F | HEART RATE: 79 BPM | WEIGHT: 210 LBS

## 2020-07-01 DIAGNOSIS — Z90.49 ACQUIRED ABSENCE OF OTHER SPECIFIED PARTS OF DIGESTIVE TRACT: Chronic | ICD-10-CM

## 2020-07-01 PROCEDURE — 99213 OFFICE O/P EST LOW 20 MIN: CPT

## 2020-07-01 NOTE — ASSESSMENT
[FreeTextEntry1] : 1. Chronic lymphocytic leukemia with mild lymphocytosis, asymptomatic.\par 2. Thrombocytosis, reactive- resolved\par \par Recommendation: \par - Repeat blood work reviewed with the patient She has stable lymphocytosis with normal Hb and PLT, and is asymptomatic. \par - Continue observation.\par -- Repeat blood work in 6 months.\par - RTC in 6 months. \par \par Case was seen and discussed with Dr. Brunson  who agreed with the assessment and plan.\par

## 2020-07-01 NOTE — PHYSICAL EXAM
[Fully active, able to carry on all pre-disease performance without restriction] : Status 0 - Fully active, able to carry on all pre-disease performance without restriction [Normal] : affect appropriate [de-identified] : no splenomegaly.  [de-identified] : There is no palpable cervical lymphadenopathy.

## 2020-07-01 NOTE — HISTORY OF PRESENT ILLNESS
[de-identified] : 10/26/18;\par The patient is here for followup visit. She has CLL and has been kept on observation. She feels fatigue but no fever, night sweat or weight loss. She had blood work on 10/19/18. CBC showed stable lymphocytosis.\par \par 4/26/19: \par Pt returns for f/u visit. No fresh complaints. Denies having fever, night sweats, weight loss, pruritus or LUQ pain. No new lumps or masses. Her CBC showed mild increase in WBC from 16K to 18K. Lymphocytic count increased from 10K to 13K. HB and plts wnl.\par \par 11/11/19\par Patient is here today for follow up visit accompanied by her friend.  She offers no new complaints.  She denies any fever, night sweats, pain, weight loss or any masses. She had recent labs from [facility] which were stable.  WBC=17.0, Hgb=12.8, Hct=38.1, Qyl=155.  Results will be scanned into [employer]. \par \par 7/1/2020: DAVION ELLSWORTH is a 55 year old female here today for follow up visit for CLL. She denies any unintentional weight loss, fevers, chills, or abdominal pain. She complains of sweating when waking up in the morning and radiating RUQ pain with palpation; most likely hernia related. She has been following with her endocrinologist who just decreased her levothyroxine.  [de-identified] : 52-year-old female is here today for 6 months f/u visit. She has chronic lymphocytic leukemia. She saw Dr. Wesly Barr and had a blood workup in 12/2016.  CBC shows WBC 13.4 with 54% lymphocytes and absolute lymphocyte counts of 7276.  Hemoglobin was 13.3 g/dL and platelet counts 431. Her electrolytes and renal function were within normal limits.  On 12/29/16, flow cytometry of peripheral blood performed at [facility] revealed small mature lymphocytes positive for CD5, CD19, CD20 (dim), CD23, HLL-DR, CD11c and surface lambda light chain restriction (dim signal). These B cells were negative for CD10 and CD38. 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  On January 4, 2016, she had CT of the abdomen and [facility] contrast [facility], which showed fatty liver. The spleen was not enlarged. The patient has no fever, night sweats, weight loss, or poor appetite.\par \par Blood work performed during her last visit showed mild lymphocytosis, normal Hb and elevated PLT > 500. SPEP/OUSMANE was negative for monoclonal protein. Quantitative IgG, IgA and IgM were within normal ranges. \par \par Today, she report she is feeling better , no longer on Prednisone and  Methotrexate  patient had rash with Methotrexate . On 3/20/18 patient had Ct of Abdomen and pelvis which reveal small Lymph node in the region of the celiac axis and also retroperitoneal area . largest lymph node measure 14 mm on the left and 11 mm on the right.On 3/27/18 patient had blood work we reviewed all blood result .  \par Also patient report mild right side back pain , she will follow up with PMD . Patient ambulating fully independent \par \par Review of systems is otherwise negative.\par \par \par \par \par \par \par \par \par \par \par \par Review of systems is otherwise negative

## 2020-07-07 DIAGNOSIS — C91.10 CHRONIC LYMPHOCYTIC LEUKEMIA OF B-CELL TYPE NOT HAVING ACHIEVED REMISSION: ICD-10-CM

## 2020-07-20 ENCOUNTER — TRANSCRIPTION ENCOUNTER (OUTPATIENT)
Age: 55
End: 2020-07-20

## 2021-01-06 ENCOUNTER — LABORATORY RESULT (OUTPATIENT)
Age: 56
End: 2021-01-06

## 2021-01-06 ENCOUNTER — APPOINTMENT (OUTPATIENT)
Dept: HEMATOLOGY ONCOLOGY | Facility: CLINIC | Age: 56
End: 2021-01-06
Payer: MEDICAID

## 2021-01-06 VITALS
HEIGHT: 68 IN | TEMPERATURE: 97.6 F | SYSTOLIC BLOOD PRESSURE: 124 MMHG | WEIGHT: 210 LBS | DIASTOLIC BLOOD PRESSURE: 90 MMHG | BODY MASS INDEX: 31.83 KG/M2 | HEART RATE: 76 BPM

## 2021-01-06 PROCEDURE — 99213 OFFICE O/P EST LOW 20 MIN: CPT

## 2021-01-12 LAB
ALBUMIN SERPL ELPH-MCNC: 4.7 G/DL
ALBUMIN SERPL ELPH-MCNC: 4.9 G/DL
ALP BLD-CCNC: 110 U/L
ALP BLD-CCNC: 112 U/L
ALT SERPL-CCNC: 30 U/L
ALT SERPL-CCNC: 33 U/L
ANION GAP SERPL CALC-SCNC: 11 MMOL/L
ANION GAP SERPL CALC-SCNC: 11 MMOL/L
AST SERPL-CCNC: 23 U/L
AST SERPL-CCNC: 27 U/L
BILIRUB SERPL-MCNC: 0.4 MG/DL
BILIRUB SERPL-MCNC: 0.4 MG/DL
BUN SERPL-MCNC: 13 MG/DL
BUN SERPL-MCNC: 8 MG/DL
CALCIUM SERPL-MCNC: 9.6 MG/DL
CALCIUM SERPL-MCNC: 9.9 MG/DL
CHLORIDE SERPL-SCNC: 101 MMOL/L
CHLORIDE SERPL-SCNC: 104 MMOL/L
CO2 SERPL-SCNC: 25 MMOL/L
CO2 SERPL-SCNC: 27 MMOL/L
CREAT SERPL-MCNC: 0.8 MG/DL
CREAT SERPL-MCNC: 0.8 MG/DL
GLUCOSE SERPL-MCNC: 86 MG/DL
GLUCOSE SERPL-MCNC: 88 MG/DL
HCT VFR BLD CALC: 39 %
HCT VFR BLD CALC: 39.8 %
HGB BLD-MCNC: 12.6 G/DL
HGB BLD-MCNC: 13 G/DL
LDH SERPL-CCNC: 193
LDH SERPL-CCNC: 201
MCHC RBC-ENTMCNC: 30.2 PG
MCHC RBC-ENTMCNC: 30.2 PG
MCHC RBC-ENTMCNC: 32.3 G/DL
MCHC RBC-ENTMCNC: 32.7 G/DL
MCV RBC AUTO: 92.6 FL
MCV RBC AUTO: 93.5 FL
PLATELET # BLD AUTO: 357 K/UL
PLATELET # BLD AUTO: 378 K/UL
PMV BLD: 8.7 FL
PMV BLD: 8.8 FL
POTASSIUM SERPL-SCNC: 4.6 MMOL/L
POTASSIUM SERPL-SCNC: 4.9 MMOL/L
PROT SERPL-MCNC: 7.4 G/DL
PROT SERPL-MCNC: 7.6 G/DL
RBC # BLD: 4.17 M/UL
RBC # BLD: 4.3 M/UL
RBC # FLD: 12.7 %
RBC # FLD: 12.9 %
SODIUM SERPL-SCNC: 139 MMOL/L
SODIUM SERPL-SCNC: 140 MMOL/L
WBC # FLD AUTO: 20.35 K/UL
WBC # FLD AUTO: 22.54 K/UL

## 2021-01-12 NOTE — HISTORY OF PRESENT ILLNESS
[de-identified] : 10/26/18;\par The patient is here for followup visit. She has CLL and has been kept on observation. She feels fatigue but no fever, night sweat or weight loss. She had blood work on 10/19/18. CBC showed stable lymphocytosis.\par \par 4/26/19: \par Pt returns for f/u visit. No fresh complaints. Denies having fever, night sweats, weight loss, pruritus or LUQ pain. No new lumps or masses. Her CBC showed mild increase in WBC from 16K to 18K. Lymphocytic count increased from 10K to 13K. HB and plts wnl.\par \par 11/11/19\par Patient is here today for follow up visit accompanied by her friend.  She offers no new complaints.  She denies any fever, night sweats, pain, weight loss or any masses. She had recent labs from [facility] which were stable.  WBC=17.0, Hgb=12.8, Hct=38.1, Yxp=942.  Results will be scanned into [employer]. \par \par 01/06/2021: DAVION is here for follow up visit for CLL. She denies any fever, night sweats, pain, weight loss or any adenopathy  She was having throat pain; sonogram of thyroid was unremarkable. In addition, she went for a US of the head and neck. Results showed reactive adenopathy. She continues to follow with GI for constipation and abdominal pain. WBC: 22.54 Hgb:13 Plt: 378\par  [de-identified] : 52-year-old female is here today for 6 months f/u visit. She has chronic lymphocytic leukemia. She saw Dr. Wesly Barr and had a blood workup in 12/2016.  CBC shows WBC 13.4 with 54% lymphocytes and absolute lymphocyte counts of 7276.  Hemoglobin was 13.3 g/dL and platelet counts 431. Her electrolytes and renal function were within normal limits.  On 12/29/16, flow cytometry of peripheral blood performed at [facility] lab revealed small mature lymphocytes positive for CD5, CD19, CD20 (dim), CD23, HLL-DR, CD11c and surface lambda light chain restriction (dim signal). These B cells were negative for CD10 and CD38. 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  On January 4, 2016, she had CT of the abdomen and pelvis [facility] contrast [facility], which showed fatty liver. The spleen was not enlarged. The patient has no fever, night sweats, weight loss, or poor appetite.\par \par Blood work performed during her last visit showed mild lymphocytosis, normal Hb and elevated PLT > 500. SPEP/OUSMANE was negative for monoclonal protein. Quantitative IgG, IgA and IgM were within normal ranges. \par \par Today, she report she is feeling better , no longer on Prednisone and  Methotrexate  patient had rash with Methotrexate . On 3/20/18 patient had Ct of Abdomen and pelvis which reveal small Lymph node in the region of the celiac axis and also retroperitoneal area . largest lymph node measure 14 mm on the left and 11 mm on the right.On 3/27/18 patient had blood work we reviewed all blood result .  \par Also patient report mild right side back pain , she will follow up with PMD . Patient ambulating fully independent \par \par Review of systems is otherwise negative.\par \par \par \par \par \par \par \par \par \par \par \par Review of systems is otherwise negative

## 2021-01-12 NOTE — PHYSICAL EXAM
[Fully active, able to carry on all pre-disease performance without restriction] : Status 0 - Fully active, able to carry on all pre-disease performance without restriction [Normal] : affect appropriate [de-identified] : There is no palpable cervical lymphadenopathy.

## 2021-01-12 NOTE — ASSESSMENT
[FreeTextEntry1] : 1. Chronic lymphocytic leukemia with mild lymphocytosis, asymptomatic.\par 2. Thrombocytosis, reactive.\par \par Recommendation: \par - Repeat blood work reviewed with the patient She has stable lymphocytosis with normal Hb and PLT, and is asymptomatic. \par - Continue observation.\par - Follow up [facility]\par - New throat pain: US from 11/2/2020 showed reactive adenopathy\par - Repeat blood work in 6 months.\par - RTC in 6 months. \par \par Case was seen and discussed with Dr. Brunson  who agreed with the assessment and plan.\par

## 2021-01-25 ENCOUNTER — TRANSCRIPTION ENCOUNTER (OUTPATIENT)
Age: 56
End: 2021-01-25

## 2021-04-05 ENCOUNTER — APPOINTMENT (OUTPATIENT)
Dept: HEMATOLOGY ONCOLOGY | Facility: CLINIC | Age: 56
End: 2021-04-05
Payer: MEDICAID

## 2021-04-05 ENCOUNTER — OUTPATIENT (OUTPATIENT)
Dept: OUTPATIENT SERVICES | Facility: HOSPITAL | Age: 56
LOS: 1 days | Discharge: HOME | End: 2021-04-05

## 2021-04-05 ENCOUNTER — LABORATORY RESULT (OUTPATIENT)
Age: 56
End: 2021-04-05

## 2021-04-05 VITALS
TEMPERATURE: 98 F | SYSTOLIC BLOOD PRESSURE: 133 MMHG | BODY MASS INDEX: 33.65 KG/M2 | DIASTOLIC BLOOD PRESSURE: 76 MMHG | WEIGHT: 222 LBS | HEIGHT: 68 IN | HEART RATE: 71 BPM

## 2021-04-05 DIAGNOSIS — Z90.49 ACQUIRED ABSENCE OF OTHER SPECIFIED PARTS OF DIGESTIVE TRACT: Chronic | ICD-10-CM

## 2021-04-05 DIAGNOSIS — C91.10 CHRONIC LYMPHOCYTIC LEUKEMIA OF B-CELL TYPE NOT HAVING ACHIEVED REMISSION: ICD-10-CM

## 2021-04-05 PROCEDURE — 99213 OFFICE O/P EST LOW 20 MIN: CPT

## 2021-04-18 NOTE — PHYSICAL EXAM
[Fully active, able to carry on all pre-disease performance without restriction] : Status 0 - Fully active, able to carry on all pre-disease performance without restriction [Normal] : affect appropriate [de-identified] : There is no palpable cervical lymphadenopathy.

## 2021-04-18 NOTE — HISTORY OF PRESENT ILLNESS
[de-identified] : 10/26/18;\par The patient is here for followup visit. She has CLL and has been kept on observation. She feels fatigue but no fever, night sweat or weight loss. She had blood work on 10/19/18. CBC showed stable lymphocytosis.\par \par 4/26/19: \par Pt returns for f/u visit. No fresh complaints. Denies having fever, night sweats, weight loss, pruritus or LUQ pain. No new lumps or masses. Her CBC showed mild increase in WBC from 16K to 18K. Lymphocytic count increased from 10K to 13K. HB and plts wnl.\par \par 11/11/19\par Patient is here today for follow up visit accompanied by her friend.  She offers no new complaints.  She denies any fever, night sweats, pain, weight loss or any masses. She had recent labs from [facility] which were stable.  WBC=17.0, Hgb=12.8, Hct=38.1, Htj=587.  Results will be scanned into All[employer]. \par \par 01/06/2021: DAVION is here for follow up visit for CLL. She denies any fever, night sweats, pain, weight loss or any adenopathy  She was having throat pain; sonogram of thyroid was unremarkable. In addition, she went for a US of the head and neck. Results showed reactive adenopathy. She continues to follow with GI for constipation and abdominal pain. WBC: 22.54 Hgb:13 Plt: 378\par \par 04/05/2021: DAVION is here for follow up visit for CLL. She denies any fever, night sweats, pain, weight loss or any adenopathy. She states that she has been having episodes of increased fatigue. She was having issues of throat pain. She went for a US of the head and neck which showed reactive adenopathy. She continues to follow with an endocrinologist. CBC reviewed WBC noted to be tipping up at 25.42 and lymphocytes of 20.10; hemogram and plt normal. \par \par  [de-identified] : 52-year-old female is here today for 6 months f/u visit. She has chronic lymphocytic leukemia. She saw Dr. Wesly Barr and had a blood workup in 12/2016.  CBC shows WBC 13.4 with 54% lymphocytes and absolute lymphocyte counts of 7276.  Hemoglobin was 13.3 g/dL and platelet counts 431. Her electrolytes and renal function were within normal limits.  On 12/29/16, flow cytometry of peripheral blood performed at [facility] lab revealed small mature lymphocytes positive for CD5, CD19, CD20 (dim), CD23, HLL-DR, CD11c and surface lambda light chain restriction (dim signal). These B cells were negative for CD10 and CD38. 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  On January 4, 2016, she had [facility] of the abdomen and [facility], which showed fatty liver. The spleen was not enlarged. The patient has no fever, night sweats, weight loss, or poor appetite.\par \par Blood work performed during her last visit showed mild lymphocytosis, normal Hb and elevated PLT > 500. SPEP/OUSMANE was negative for monoclonal protein. Quantitative IgG, IgA and IgM were within normal ranges. \par \par Today, she report she is feeling better , no longer on Prednisone and  Methotrexate  patient had rash with Methotrexate . On 3/20/18 patient had Ct of Abdomen and pelvis which reveal small Lymph node in the region of the celiac axis and also retroperitoneal area . largest lymph node measure 14 mm on the left and 11 mm on the right.On 3/27/18 patient had blood work we reviewed all blood result .  \par Also patient report mild right side back pain , she will follow up with PMD . Patient ambulating fully independent \par \par Review of systems is otherwise negative.\par \par \par \par \par \par \par \par \par \par \par \par Review of systems is otherwise negative

## 2021-04-18 NOTE — ASSESSMENT
[FreeTextEntry1] : 1. Chronic lymphocytic leukemia with mild lymphocytosis, asymptomatic.\par 2. Thrombocytosis, reactive.\par \par Recommendation: \par - Repeat blood work reviewed with the patient She has stable lymphocytosis with normal Hb and PLT, and is asymptomatic. CLL stable WBC and lymphocytes noted to be tipping up slowly\par - Continue observation.\par - Order CMP LDH. Will f/u results.\par - Throat pain resolved: US from 11/2/2020 showed reactive adenopathy\par - Repeat blood work in 6 months.\par - RTC in 6 months. \par \par Case was seen and discussed with Dr. Brunson  who agreed with the assessment and plan.\par

## 2021-07-14 NOTE — ED ADULT NURSE NOTE - CAS TRG GENERAL NORM CIRC DET
SURGERY VISIT        REASON FOR VISIT:  I have been asked to evaluate the patient at the request of Dr. Bustos for bright red blood per rectum and change in stool.    HISTORY OF PRESENT ILLNESS:  Ms. Hanna is a 59 year old female who underwent a colonoscopy on 09/20/2013 performed by myself which showed suspected prominent lympathic tissue and 2 external hemorrhoids. I recommended repeating colonoscopy in 10 years.     She saw Dr. Bustos on 06/18/2021 for a yearly physical. During that visit the patient reporting having one episode of bright red blood per rectum 2 months ago, stools have been thinner and a lot of blood in the toilet. Portions of this note are brought forward from Dr. Bustos's note; reviewed and edited by me as appropriate.     She states in the last 3-4 months her stool has become more narrow and she has a feeling of incomplete evacuation. She had one episode of blood in the toilet water. She has 1-2 bowel movements a day. She states when she eats fatty/fried foods she develops loose stools. She denies abdominal pain. She favors fruits but does not eat a lot of vegetables. She eats Activia yogurt. She drinks a protein shake for breakfast.       Family history: She believes her mother had a cholecystectomy. A nephew has Crohn's disease.       I have reviewed the patient's medications and allergies, past medical, surgical, social and family history, updating these as appropriate.  See Histories section of the electronic medical record for a display of this information.    Past Surgical History:   Procedure Laterality Date   • Appendectomy  1980   • Removal of tonsils,<12 y/o     • [place] tooth extraction         Past Medical History:   Diagnosis Date   • Asthma     allergy-induced, no medications   • Heart palpitations     chronic, intermittent.  negative cardiac eval   • Vertigo        Social History     Tobacco Use   • Smoking status: Never Smoker   • Smokeless tobacco: Never Used    Substance Use Topics   • Alcohol use: No     Alcohol/week: 0.0 standard drinks     Comment: very rare   • Drug use: No       ADDITIONAL SOCIAL HISTORY:  She works for [employer].   She is     REVIEW OF SYSTEMS:  A complete review of systems was performed and is negative with the exception of those items located in the history of present illness above.  Specifically:    General:  No fevers, chills or unintentional weight loss  Neurologic:  No balance difficulty, no dizziness  Eyes, Ears, Nose, Throat:  No recent visual changes, no earache  Cardiovascular:  No ankle swelling, no chest pain  Respiratory:  No dyspnea on exertion.  No wheezing  Gastrointestinal:  No nausea or vomiting, no melena. Hematochezia and change in stools   Genitourinary:  No urinary incontinence or hematuria     Psych:  No history of anxiety or depression  Hematologic:  No history of bleeding or clotting disorder  Endocrine:  No heat or cold intolerance  Musculoskeletal:  No extremity numbness or tingling   Skin:  No new skin lesion or hair loss      PHYSICAL EXAM:  Visit Vitals  LMP 11/07/2010     General:  No acute distress.   Neurologic:  Alert and oriented to person, place and time.  HEENT:  Pupils are equal, round, reactive to light.  Extraocular movements are intact,  no scleral icterus.  No anterior cervical or supraclavicular lymphadenopathy.  Cardiovascular:  S1 and S2 are appreciated.  No murmur, rub or gallop; no lower extremity edema.  Lungs:  Clear to auscultation; bilateral, equal expansion; no wheeze, no crackles.  Abdomen:  Soft, non-distended, non-tender.  Non-tympanitic.  No hepatosplenomegaly.  Skin:  Warm, dry.   Perianal and rectal examination including anoscopy is deferred until colonoscopy      ASSESSMENT AND PLAN:  Ms. Hanna is a 59 year old female with:    1. One episode of hematochezia. I recommend a colonoscopy under IV sedation with possible banding of internal hemorrhoids if identified at the time of  colonoscopy, hemorrhoid banding could be performed.  2. Slight change in bowel function with narrower caliber stools and with fattier foods. I recommend increasing her daily fiber intake with Flax seed or fiber supplements.   3. External hemorrhoids seen on colonoscopy in 2013.    4. Paroxysmal supraventricular tachycardia. Currently on metoprolol.       Given the hematochezia, I recommend colonoscopy be performed at the patient's convenience.  We discussed potential banding of an internal hemorrhoid if identified at the time of colonoscopy.    I discussed with the patient the risks, benefits and indications of colonoscopy.  We discussed bleeding infection and perforation of the colon, which are the most common risks.  Perforation would require immediate surgical intervention and occurs in 0.2% of colonoscopies.  We discussed that colonoscopy provides the opportunity to remove pathology such as colon polyps which will be sent to pathology for definitive diagnosis.  Large polyps cannot always be safely removed at the time of colonoscopy and may require future surgical intervention.  Colonoscopy also provides the opportunity to biopsy abnormalities such as colitis.  We discussed that on rare occasions I encounter the inability to complete colonoscopy due to poor prep or colonic tortuosity.  IV sedation was discussed with the patient including the amnestic effects experienced by most patients.  The patient was informed that hospital policy requires a   the patient after the procedure.  The patient expresses understanding of the risks and benefits and wishes to proceed.    Thank you, Dr. Bustos, for the opportunity to participate in the care of your patient, Neema Hanna.    On 7/22/2021, Melvin MCGREGOR scribed the services personally performed by Alexi Fernandes MD    The documentation recorded by the scribe accurately and completely reflects the service(s) Alexi MCGREGOR MD MPH FACS personally  performed and the decisions made by me.     Strong peripheral pulses

## 2021-08-27 NOTE — ED PROVIDER NOTE - NSFOLLOWUPCLINICS_GEN_ALL_ED_FT
Location #2: [facility]  Dental  [address] [place] [id number]  Phone: [phone]  Fax:   Follow Up Time:

## 2021-10-06 ENCOUNTER — OUTPATIENT (OUTPATIENT)
Dept: OUTPATIENT SERVICES | Facility: HOSPITAL | Age: 56
LOS: 1 days | Discharge: HOME | End: 2021-10-06

## 2021-10-06 ENCOUNTER — APPOINTMENT (OUTPATIENT)
Dept: HEMATOLOGY ONCOLOGY | Facility: CLINIC | Age: 56
End: 2021-10-06
Payer: MEDICAID

## 2021-10-06 ENCOUNTER — LABORATORY RESULT (OUTPATIENT)
Age: 56
End: 2021-10-06

## 2021-10-06 VITALS
SYSTOLIC BLOOD PRESSURE: 119 MMHG | BODY MASS INDEX: 30.92 KG/M2 | RESPIRATION RATE: 14 BRPM | HEIGHT: 68 IN | HEART RATE: 78 BPM | DIASTOLIC BLOOD PRESSURE: 90 MMHG | TEMPERATURE: 97.91 F | WEIGHT: 204 LBS

## 2021-10-06 DIAGNOSIS — Z90.49 ACQUIRED ABSENCE OF OTHER SPECIFIED PARTS OF DIGESTIVE TRACT: Chronic | ICD-10-CM

## 2021-10-06 LAB
ALBUMIN SERPL ELPH-MCNC: 4.8 G/DL
ALP BLD-CCNC: 116 U/L
ALT SERPL-CCNC: 28 U/L
ANION GAP SERPL CALC-SCNC: 9 MMOL/L
AST SERPL-CCNC: 22 U/L
BILIRUB SERPL-MCNC: 0.2 MG/DL
BUN SERPL-MCNC: 13 MG/DL
CALCIUM SERPL-MCNC: 10.1 MG/DL
CHLORIDE SERPL-SCNC: 101 MMOL/L
CO2 SERPL-SCNC: 26 MMOL/L
CREAT SERPL-MCNC: 0.9 MG/DL
GLUCOSE SERPL-MCNC: 75 MG/DL
HCT VFR BLD CALC: 38.8 %
HGB BLD-MCNC: 12.7 G/DL
LDH SERPL-CCNC: 199
MCHC RBC-ENTMCNC: 30.8 PG
MCHC RBC-ENTMCNC: 32.7 G/DL
MCV RBC AUTO: 94.2 FL
PLATELET # BLD AUTO: 362 K/UL
PMV BLD: 8.7 FL
POTASSIUM SERPL-SCNC: 4.7 MMOL/L
PROT SERPL-MCNC: 7.7 G/DL
RBC # BLD: 4.12 M/UL
RBC # FLD: 12.9 %
SODIUM SERPL-SCNC: 136 MMOL/L
WBC # FLD AUTO: 25.42 K/UL

## 2021-10-06 PROCEDURE — 99213 OFFICE O/P EST LOW 20 MIN: CPT

## 2021-10-09 LAB
ALBUMIN SERPL ELPH-MCNC: 5 G/DL
ALP BLD-CCNC: 113 U/L
ALT SERPL-CCNC: 15 U/L
ANION GAP SERPL CALC-SCNC: 14 MMOL/L
AST SERPL-CCNC: 15 U/L
BILIRUB SERPL-MCNC: 0.3 MG/DL
BUN SERPL-MCNC: 14 MG/DL
CALCIUM SERPL-MCNC: 10.1 MG/DL
CHLORIDE SERPL-SCNC: 100 MMOL/L
CO2 SERPL-SCNC: 25 MMOL/L
CREAT SERPL-MCNC: 0.9 MG/DL
GLUCOSE SERPL-MCNC: 80 MG/DL
HCT VFR BLD CALC: 40.2 %
HGB BLD-MCNC: 13.2 G/DL
LDH SERPL-CCNC: 192
MCHC RBC-ENTMCNC: 30.6 PG
MCHC RBC-ENTMCNC: 32.8 G/DL
MCV RBC AUTO: 93.3 FL
PLATELET # BLD AUTO: 379 K/UL
PMV BLD: 8.7 FL
POTASSIUM SERPL-SCNC: 5 MMOL/L
PROT SERPL-MCNC: 7.8 G/DL
RBC # BLD: 4.31 M/UL
RBC # FLD: 13.2 %
SODIUM SERPL-SCNC: 139 MMOL/L
WBC # FLD AUTO: 29.31 K/UL

## 2021-10-09 NOTE — PHYSICAL EXAM
[Fully active, able to carry on all pre-disease performance without restriction] : Status 0 - Fully active, able to carry on all pre-disease performance without restriction [Normal] : affect appropriate [de-identified] : There is no palpable cervical lymphadenopathy.

## 2021-10-09 NOTE — ASSESSMENT
[FreeTextEntry1] : 1. Chronic lymphocytic leukemia with mild lymphocytosis, asymptomatic.\par 2. Thrombocytosis, reactive.\par \par Recommendation: \par - Repeat blood work reviewed with the patient She has lymphocytosis with normal Hb and PLT. WBC is trending up mildly. She is asymptomatic. \par - Continue observation.\par - Order CMP LDH. Will f/u results.\par - Throat pain resolved: US from 9/23/2021 showed small lymph nodes bilaterally, none with any worrisome features. \par - RTC in 6 months with repeat blood work. \par \par Case was seen and discussed with Dr. Brunson  who agreed with the assessment and plan.\par

## 2021-10-09 NOTE — HISTORY OF PRESENT ILLNESS
[de-identified] : 52-year-old female is here today for 6 months f/u visit. She has chronic lymphocytic leukemia. She saw Dr. Wesly Barr and had a blood workup in 12/2016.  CBC shows WBC 13.4 with 54% lymphocytes and absolute lymphocyte counts of 7276.  Hemoglobin was 13.3 g/dL and platelet counts 431. Her electrolytes and renal function were within normal limits.  On 12/29/16, flow cytometry of peripheral blood performed at [facility] lab revealed small mature lymphocytes positive for CD5, CD19, CD20 (dim), CD23, HLL-DR, CD11c and surface lambda light chain restriction (dim signal). These B cells were negative for CD10 and CD38. 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  On January 4, 2016, she had CT of the abdomen and [facility], which showed fatty liver. The spleen was not enlarged. The patient has no fever, night sweats, weight loss, or poor appetite.\par \par Blood work performed during her last visit showed mild lymphocytosis, normal Hb and elevated PLT > 500. SPEP/OUSMANE was negative for monoclonal protein. Quantitative IgG, IgA and IgM were within normal ranges. \par \par Today, she report she is feeling better , no longer on Prednisone and  Methotrexate  patient had rash with Methotrexate . On 3/20/18 patient had Ct of Abdomen and pelvis which reveal small Lymph node in the region of the celiac axis and also retroperitoneal area . largest lymph node measure 14 mm on the left and 11 mm on the right.On 3/27/18 patient had blood work we reviewed all blood result .  \par Also patient report mild right side back pain , she will follow up with PMD . Patient ambulating fully independent \par \par \par \par \par \par \par \par \par \par \par \par \par  [de-identified] : 10/26/18;\par The patient is here for followup visit. She has CLL and has been kept on observation. She feels fatigue but no fever, night sweat or weight loss. She had blood work on 10/19/18. CBC showed stable lymphocytosis.\par \par 4/26/19: \par Pt returns for f/u visit. No fresh complaints. Denies having fever, night sweats, weight loss, pruritus or LUQ pain. No new lumps or masses. Her CBC showed mild increase in WBC from 16K to 18K. Lymphocytic count increased from 10K to 13K. HB and plts wnl.\par \par 11/11/19\par Patient is here today for follow up visit accompanied by her friend.  She offers no new complaints.  She denies any fever, night sweats, pain, weight loss or any masses. She had recent labs from [facility] which were stable.  WBC=17.0, Hgb=12.8, Hct=38.1, Bbl=695.  Results will be scanned into [employer]scripts. \par \par 01/06/2021: DAVION is here for follow up visit for CLL. She denies any fever, night sweats, pain, weight loss or any adenopathy  She was having throat pain; sonogram of thyroid was unremarkable. In addition, she went for a US of the head and neck. Results showed reactive adenopathy. She continues to follow with GI for constipation and abdominal pain. WBC: 22.54 Hgb:13 Plt: 378\par \par 04/05/2021: DAVION is here for follow up visit for CLL. She denies any fever, night sweats, pain, weight loss or any adenopathy. She states that she has been having episodes of increased fatigue. She was having issues of throat pain. She went for a US of the head and neck which showed reactive adenopathy. She continues to follow with an endocrinologist. CBC reviewed WBC noted to be tipping up at 25.42 and lymphocytes of 20.10; hemogram and plt normal. \par \par 10/06/2021\par DAVION is here for follow up visit for CLL. She denies any fever, night sweats, pain, or any adenopathy. She has an 18 lb unintentional weight loss, she does report under a lot of stress. She went for a [facility] of head [facility] which showed small and diffusely heterogenous thyroid with no focal nodule in either lobe. Small lymph nodes bilaterally, none with any worrisome features. She continues to follow with an endocrinologist. CBC reviewed WBC noted to be tipping up at 29.31 and lymphocytes of 23.0; hemogram and plt normal. \par \par

## 2021-10-12 DIAGNOSIS — C91.10 CHRONIC LYMPHOCYTIC LEUKEMIA OF B-CELL TYPE NOT HAVING ACHIEVED REMISSION: ICD-10-CM

## 2022-04-06 ENCOUNTER — APPOINTMENT (OUTPATIENT)
Dept: HEMATOLOGY ONCOLOGY | Facility: CLINIC | Age: 57
End: 2022-04-06
Payer: COMMERCIAL

## 2022-04-06 ENCOUNTER — OUTPATIENT (OUTPATIENT)
Dept: OUTPATIENT SERVICES | Facility: HOSPITAL | Age: 57
LOS: 1 days | Discharge: HOME | End: 2022-04-06

## 2022-04-06 ENCOUNTER — LABORATORY RESULT (OUTPATIENT)
Age: 57
End: 2022-04-06

## 2022-04-06 VITALS
SYSTOLIC BLOOD PRESSURE: 134 MMHG | HEIGHT: 68 IN | BODY MASS INDEX: 32.43 KG/M2 | TEMPERATURE: 97.2 F | HEART RATE: 75 BPM | WEIGHT: 214 LBS | DIASTOLIC BLOOD PRESSURE: 60 MMHG | OXYGEN SATURATION: 98 % | RESPIRATION RATE: 14 BRPM

## 2022-04-06 DIAGNOSIS — Z90.49 ACQUIRED ABSENCE OF OTHER SPECIFIED PARTS OF DIGESTIVE TRACT: Chronic | ICD-10-CM

## 2022-04-06 LAB
HCT VFR BLD CALC: 39.1 %
HGB BLD-MCNC: 12.7 G/DL
MCHC RBC-ENTMCNC: 30.2 PG
MCHC RBC-ENTMCNC: 32.5 G/DL
MCV RBC AUTO: 93.1 FL
PLATELET # BLD AUTO: 366 K/UL
PMV BLD: 8.6 FL
RBC # BLD: 4.2 M/UL
RBC # FLD: 12.7 %
WBC # FLD AUTO: 34.49 K/UL

## 2022-04-06 PROCEDURE — 99213 OFFICE O/P EST LOW 20 MIN: CPT

## 2022-04-06 NOTE — ASSESSMENT
[FreeTextEntry1] : Chronic lymphocytic leukemia with mild lymphocytosis.\par \par Assessment and Plan:\par - Repeat CBC today shows lymphocytosis with WBC 43.49,  normal Hb and PLT. WBC is trending up.  \par - Continue observation.\par - Order CMP LDH. Will f/u results.\par - Sore throat and feeling neck swelling. Advised to see ENT. \par - RTC in 6 months with repeat blood work. \par \par

## 2022-04-06 NOTE — PHYSICAL EXAM
[Fully active, able to carry on all pre-disease performance without restriction] : Status 0 - Fully active, able to carry on all pre-disease performance without restriction [Normal] : affect appropriate [de-identified] : There is no palpable cervical lymphadenopathy.

## 2022-04-07 DIAGNOSIS — C91.10 CHRONIC LYMPHOCYTIC LEUKEMIA OF B-CELL TYPE NOT HAVING ACHIEVED REMISSION: ICD-10-CM

## 2022-04-18 ENCOUNTER — TRANSCRIPTION ENCOUNTER (OUTPATIENT)
Age: 57
End: 2022-04-18

## 2022-10-26 ENCOUNTER — APPOINTMENT (OUTPATIENT)
Dept: HEMATOLOGY ONCOLOGY | Facility: CLINIC | Age: 57
End: 2022-10-26

## 2022-10-26 ENCOUNTER — OUTPATIENT (OUTPATIENT)
Dept: OUTPATIENT SERVICES | Facility: HOSPITAL | Age: 57
LOS: 1 days | End: 2022-10-26

## 2022-10-26 VITALS
BODY MASS INDEX: 34.86 KG/M2 | SYSTOLIC BLOOD PRESSURE: 128 MMHG | DIASTOLIC BLOOD PRESSURE: 61 MMHG | HEART RATE: 72 BPM | OXYGEN SATURATION: 98 % | HEIGHT: 68 IN | WEIGHT: 230 LBS | TEMPERATURE: 96.4 F | RESPIRATION RATE: 14 BRPM

## 2022-10-26 DIAGNOSIS — Z90.49 ACQUIRED ABSENCE OF OTHER SPECIFIED PARTS OF DIGESTIVE TRACT: Chronic | ICD-10-CM

## 2022-10-26 LAB
ALBUMIN SERPL ELPH-MCNC: 4.6 G/DL
ALP BLD-CCNC: 120 U/L
ALT SERPL-CCNC: 23 U/L
ANION GAP SERPL CALC-SCNC: 10 MMOL/L
AST SERPL-CCNC: 16 U/L
BILIRUB DIRECT SERPL-MCNC: <0.2 MG/DL
BILIRUB INDIRECT SERPL-MCNC: >0 MG/DL
BILIRUB SERPL-MCNC: 0.2 MG/DL
BUN SERPL-MCNC: 14 MG/DL
CALCIUM SERPL-MCNC: 9.7 MG/DL
CHLORIDE SERPL-SCNC: 102 MMOL/L
CO2 SERPL-SCNC: 27 MMOL/L
CREAT SERPL-MCNC: 0.8 MG/DL
EGFR: 87 ML/MIN/1.73M2
GLUCOSE SERPL-MCNC: 91 MG/DL
LDH SERPL-CCNC: 164
POTASSIUM SERPL-SCNC: 4.4 MMOL/L
PROT SERPL-MCNC: 7.3 G/DL
SODIUM SERPL-SCNC: 139 MMOL/L

## 2022-10-26 PROCEDURE — 99213 OFFICE O/P EST LOW 20 MIN: CPT

## 2022-10-26 NOTE — HISTORY OF PRESENT ILLNESS
[de-identified] : 52-year-old female is here today for 6 months f/u visit. She has chronic lymphocytic leukemia. She saw Dr. Wesly Barr and had a blood workup in 12/2016.  CBC shows WBC 13.4 with 54% lymphocytes and absolute lymphocyte counts of 7276.  Hemoglobin was 13.3 g/dL and platelet counts 431. Her electrolytes and renal function were within normal limits.  On 12/29/16, flow cytometry of peripheral blood performed at [facility] lab revealed small mature lymphocytes positive for CD5, CD19, CD20 (dim), CD23, HLL-DR, CD11c and surface lambda light chain restriction (dim signal). These B cells were negative for CD10 and CD38. 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  On January 4, 2016, she had CT of the abdomen and pelvis without contrast [facility], which showed fatty liver. The spleen was not enlarged. The patient has no fever, night sweats, weight loss, or poor appetite.\par \par Blood work performed during her last visit showed mild lymphocytosis, normal Hb and elevated PLT > 500. SPEP/OUSMANE was negative for monoclonal protein. Quantitative IgG, IgA and IgM were within normal ranges. \par \par Today, she report she is feeling better , no longer on Prednisone and  Methotrexate  patient had rash with Methotrexate . On 3/20/18 patient had Ct of Abdomen and pelvis which reveal small Lymph node in the region of the celiac axis and also retroperitoneal area . largest lymph node measure 14 mm on the left and 11 mm on the right.On 3/27/18 patient had blood work we reviewed all blood result .  \par Also patient report mild right side back pain , she will follow up with PMD . Patient ambulating fully independent \par \par \par \par \par \par \par \par \par \par \par \par \par  [de-identified] : 10/26/18;\par The patient is here for followup visit. She has CLL and has been kept on observation. She feels fatigue but no fever, night sweat or weight loss. She had blood work on 10/19/18. CBC showed stable lymphocytosis.\par \par 4/26/19: \par Pt returns for f/u visit. No fresh complaints. Denies having fever, night sweats, weight loss, pruritus or LUQ pain. No new lumps or masses. Her CBC showed mild increase in WBC from 16K to 18K. Lymphocytic count increased from 10K to 13K. HB and plts wnl.\par \par 11/11/19\par Patient is here today for follow up visit accompanied by her friend.  She offers no new complaints.  She denies any fever, night sweats, pain, weight loss or any masses. She had recent labs from [facility] which were stable.  WBC=17.0, Hgb=12.8, Hct=38.1, Ywc=504.  Results will be scanned into Allscripts. \par \par 01/06/2021: DAVOIN is here for follow up visit for CLL. She denies any fever, night sweats, pain, weight loss or any adenopathy  She was having throat pain; sonogram of thyroid was unremarkable. In addition, she went for a US of the head and neck. Results showed reactive adenopathy. She continues to follow with GI for constipation and abdominal pain. WBC: 22.54 Hgb:13 Plt: 378\par \par 04/05/2021: DAVION is here for follow up visit for CLL. She denies any fever, night sweats, pain, weight loss or any adenopathy. She states that she has been having episodes of increased fatigue. She was having issues of throat pain. She went for a US of the head and neck which showed reactive adenopathy. She continues to follow with an endocrinologist. CBC reviewed WBC noted to be tipping up at 25.42 and lymphocytes of 20.10; hemogram and plt normal. \par \par 10/06/2021\par DAVION is here for follow up visit for CLL. She denies any fever, night sweats, pain, or any adenopathy. She has an 18 lb unintentional weight loss, she does report under a lot of stress. She went for a US of head and neck on [facility]/23 [facility] which showed small and diffusely heterogenous thyroid with no focal nodule in either lobe. Small lymph nodes bilaterally, none with any worrisome features. She continues to follow with an endocrinologist. CBC reviewed WBC noted to be tipping up at 29.31 and lymphocytes of 23.0; hemogram and plt normal. \par \par 4/6/22:\seven RICARDO is here for follow up visit for CLL. She complains intermittent hot flashes, feeling neck swelling and sore throat. She did not have nay fever. Her WBC has been trending up. Hb and PLT remain normal. She complained RUQ pain. CT a/p with contrast in 11/2021 showed hepatic steatosis. There was no evidence of lymphadenopathy and no splenomegaly. \par \par 10/26/22\seven RICARDO is here for follow up visit for CLL. She complains intermittent hot flashes, fatigue, and generalized arthralgia, and intermittent neck swelling. She denies fevers. WBC today is stable 31.37, Hb and PLT remain normal.

## 2022-10-26 NOTE — PHYSICAL EXAM
[Fully active, able to carry on all pre-disease performance without restriction] : Status 0 - Fully active, able to carry on all pre-disease performance without restriction [Normal] : affect appropriate [de-identified] : There is no palpable cervical lymphadenopathy.

## 2022-10-26 NOTE — ASSESSMENT
[FreeTextEntry1] : Chronic lymphocytic leukemia with mild lymphocytosis.\par \par Assessment and Plan:\par - Repeat CBC today shows lymphocytosis with WBC 31.37,  normal Hb and PLT. \par - Continue observation.\par - Order CMP LDH. Will f/u results.\par - RTC in 6 months with repeat blood work. \par \par Case was seen and discussed with Dr. Brunson who agreed with the assessment and plan.\par

## 2022-10-31 DIAGNOSIS — C91.10 CHRONIC LYMPHOCYTIC LEUKEMIA OF B-CELL TYPE NOT HAVING ACHIEVED REMISSION: ICD-10-CM

## 2023-01-18 ENCOUNTER — OUTPATIENT (OUTPATIENT)
Dept: OUTPATIENT SERVICES | Facility: HOSPITAL | Age: 58
LOS: 1 days | Discharge: HOME | End: 2023-01-18

## 2023-01-18 ENCOUNTER — APPOINTMENT (OUTPATIENT)
Dept: OPHTHALMOLOGY | Facility: CLINIC | Age: 58
End: 2023-01-18
Payer: COMMERCIAL

## 2023-01-18 DIAGNOSIS — Z90.49 ACQUIRED ABSENCE OF OTHER SPECIFIED PARTS OF DIGESTIVE TRACT: Chronic | ICD-10-CM

## 2023-01-18 PROCEDURE — 92004 COMPRE OPH EXAM NEW PT 1/>: CPT

## 2023-04-26 ENCOUNTER — LABORATORY RESULT (OUTPATIENT)
Age: 58
End: 2023-04-26

## 2023-04-26 ENCOUNTER — APPOINTMENT (OUTPATIENT)
Dept: HEMATOLOGY ONCOLOGY | Facility: CLINIC | Age: 58
End: 2023-04-26
Payer: COMMERCIAL

## 2023-04-26 ENCOUNTER — APPOINTMENT (OUTPATIENT)
Dept: HEMATOLOGY ONCOLOGY | Facility: CLINIC | Age: 58
End: 2023-04-26

## 2023-04-26 ENCOUNTER — OUTPATIENT (OUTPATIENT)
Dept: OUTPATIENT SERVICES | Facility: HOSPITAL | Age: 58
LOS: 1 days | End: 2023-04-26
Payer: COMMERCIAL

## 2023-04-26 VITALS
OXYGEN SATURATION: 99 % | TEMPERATURE: 98.6 F | HEART RATE: 68 BPM | WEIGHT: 230 LBS | SYSTOLIC BLOOD PRESSURE: 124 MMHG | HEIGHT: 68 IN | RESPIRATION RATE: 18 BRPM | DIASTOLIC BLOOD PRESSURE: 61 MMHG | BODY MASS INDEX: 34.86 KG/M2

## 2023-04-26 DIAGNOSIS — C91.10 CHRONIC LYMPHOCYTIC LEUKEMIA OF B-CELL TYPE NOT HAVING ACHIEVED REMISSION: ICD-10-CM

## 2023-04-26 DIAGNOSIS — Z90.49 ACQUIRED ABSENCE OF OTHER SPECIFIED PARTS OF DIGESTIVE TRACT: Chronic | ICD-10-CM

## 2023-04-26 LAB
ALBUMIN SERPL ELPH-MCNC: 4.6 G/DL
ALP BLD-CCNC: 113 U/L
ALT SERPL-CCNC: 28 U/L
ANION GAP SERPL CALC-SCNC: 11 MMOL/L
AST SERPL-CCNC: 21 U/L
BASOPHILS # BLD AUTO: 0.08 K/UL
BASOPHILS NFR BLD AUTO: 0.3 %
BILIRUB DIRECT SERPL-MCNC: <0.2 MG/DL
BILIRUB INDIRECT SERPL-MCNC: >0.2 MG/DL
BILIRUB SERPL-MCNC: 0.4 MG/DL
BUN SERPL-MCNC: 14 MG/DL
CALCIUM SERPL-MCNC: 10.4 MG/DL
CHLORIDE SERPL-SCNC: 103 MMOL/L
CO2 SERPL-SCNC: 26 MMOL/L
CREAT SERPL-MCNC: 0.9 MG/DL
DEPRECATED KAPPA LC FREE/LAMBDA SER: 1.01 RATIO
EGFR: 75 ML/MIN/1.73M2
EOSINOPHIL # BLD AUTO: 0.17 K/UL
EOSINOPHIL NFR BLD AUTO: 0.5 %
GLUCOSE SERPL-MCNC: 93 MG/DL
HCT VFR BLD CALC: 39.4 %
HGB BLD-MCNC: 12.7 G/DL
IGA SER QL IEP: 156 MG/DL
IGG SER QL IEP: 1211 MG/DL
IGM SER QL IEP: 50 MG/DL
IMM GRANULOCYTES NFR BLD AUTO: 0.2 %
KAPPA LC CSF-MCNC: 1.55 MG/DL
KAPPA LC SERPL-MCNC: 1.56 MG/DL
LYMPHOCYTES # BLD AUTO: 25.28 K/UL
LYMPHOCYTES NFR BLD AUTO: 80.6 %
M PROTEIN SPEC IFE-MCNC: NORMAL
MAN DIFF?: NORMAL
MCHC RBC-ENTMCNC: 30 PG
MCHC RBC-ENTMCNC: 32.2 G/DL
MCV RBC AUTO: 93.1 FL
MONOCYTES # BLD AUTO: 0.67 K/UL
MONOCYTES NFR BLD AUTO: 2.1 %
NEUTROPHILS # BLD AUTO: 5.12 K/UL
NEUTROPHILS NFR BLD AUTO: 16.3 %
PLATELET # BLD AUTO: 369 K/UL
POTASSIUM SERPL-SCNC: 4.5 MMOL/L
PROT SERPL-MCNC: 7.5 G/DL
RBC # BLD: 4.23 M/UL
RBC # FLD: 13.1 %
SODIUM SERPL-SCNC: 140 MMOL/L
WBC # FLD AUTO: 31.37 K/UL

## 2023-04-26 PROCEDURE — 88275 CYTOGENETICS 100-300: CPT

## 2023-04-26 PROCEDURE — 36415 COLL VENOUS BLD VENIPUNCTURE: CPT

## 2023-04-26 PROCEDURE — 80076 HEPATIC FUNCTION PANEL: CPT

## 2023-04-26 PROCEDURE — 87205 SMEAR GRAM STAIN: CPT

## 2023-04-26 PROCEDURE — 83615 LACTATE (LD) (LDH) ENZYME: CPT

## 2023-04-26 PROCEDURE — 99214 OFFICE O/P EST MOD 30 MIN: CPT

## 2023-04-26 PROCEDURE — 88271 CYTOGENETICS DNA PROBE: CPT

## 2023-04-26 PROCEDURE — 81263 IGH VARI REGIONAL MUTATION: CPT

## 2023-04-26 PROCEDURE — 88185 FLOWCYTOMETRY/TC ADD-ON: CPT

## 2023-04-26 PROCEDURE — 88184 FLOWCYTOMETRY/ TC 1 MARKER: CPT

## 2023-04-26 PROCEDURE — 88264 CHROMOSOME ANALYSIS 20-25: CPT

## 2023-04-26 PROCEDURE — 88189 FLOWCYTOMETRY/READ 16 & >: CPT

## 2023-04-26 PROCEDURE — 88237 TISSUE CULTURE BONE MARROW: CPT

## 2023-04-26 PROCEDURE — 85027 COMPLETE CBC AUTOMATED: CPT

## 2023-04-26 PROCEDURE — 80048 BASIC METABOLIC PNL TOTAL CA: CPT

## 2023-04-26 PROCEDURE — 88280 CHROMOSOME KARYOTYPE STUDY: CPT

## 2023-04-27 DIAGNOSIS — C91.10 CHRONIC LYMPHOCYTIC LEUKEMIA OF B-CELL TYPE NOT HAVING ACHIEVED REMISSION: ICD-10-CM

## 2023-04-30 LAB
ALBUMIN SERPL ELPH-MCNC: 4.7 G/DL
ALP BLD-CCNC: 126 U/L
ALT SERPL-CCNC: 17 U/L
ANION GAP SERPL CALC-SCNC: 12 MMOL/L
AST SERPL-CCNC: 16 U/L
BILIRUB DIRECT SERPL-MCNC: <0.2 MG/DL
BILIRUB INDIRECT SERPL-MCNC: >0 MG/DL
BILIRUB SERPL-MCNC: 0.2 MG/DL
BUN SERPL-MCNC: 14 MG/DL
CALCIUM SERPL-MCNC: 9.9 MG/DL
CHLORIDE SERPL-SCNC: 101 MMOL/L
CO2 SERPL-SCNC: 28 MMOL/L
CREAT SERPL-MCNC: 0.8 MG/DL
EGFR: 86 ML/MIN/1.73M2
GLUCOSE SERPL-MCNC: 85 MG/DL
HCT VFR BLD CALC: 39.7 %
HGB BLD-MCNC: 12.2 G/DL
LDH SERPL-CCNC: 179
MCHC RBC-ENTMCNC: 28.9 PG
MCHC RBC-ENTMCNC: 30.7 G/DL
MCV RBC AUTO: 94.1 FL
PLATELET # BLD AUTO: 369 K/UL
PMV BLD: 8.5 FL
POTASSIUM SERPL-SCNC: 4.8 MMOL/L
PROT SERPL-MCNC: 7.3 G/DL
RBC # BLD: 4.22 M/UL
RBC # FLD: 12.8 %
SODIUM SERPL-SCNC: 141 MMOL/L
WBC # FLD AUTO: 33.5 K/UL

## 2023-04-30 NOTE — PHYSICAL EXAM
[Fully active, able to carry on all pre-disease performance without restriction] : Status 0 - Fully active, able to carry on all pre-disease performance without restriction [Normal] : affect appropriate [de-identified] : There is no palpable cervical lymphadenopathy.

## 2023-04-30 NOTE — ASSESSMENT
[FreeTextEntry1] : Chronic lymphocytic leukemia with lymphocytosis.\par \par Assessment and Plan:\par - Repeat CBC today shows lymphocytosis with WBC 33.6,  normal Hb and PLT. \par - Continue observation.\par - Order blood work: CMP LDH. CLL FISH and cytogenetics, [facility]. \par - RTC in 6 months with repeat blood work. \par \par

## 2023-04-30 NOTE — HISTORY OF PRESENT ILLNESS
[de-identified] : 52-year-old female is here today for 6 months f/u visit. She has chronic lymphocytic leukemia. She saw Dr. Wesly Barr and had a blood workup in 12/2016.  CBC shows WBC 13.4 with 54% lymphocytes and absolute lymphocyte counts of 7276.  Hemoglobin was 13.3 g/dL and platelet counts 431. Her electrolytes and renal function were within normal limits.  On 12/29/16, flow cytometry of peripheral blood performed at [facility] lab revealed small mature lymphocytes positive for CD5, CD19, CD20 (dim), CD23, HLL-DR, CD11c and surface lambda light chain restriction (dim signal). These B cells were negative for CD10 and CD38. 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  On January 4, 2016, she had CT of the abdomen and [facility], which showed fatty liver. The spleen was not enlarged. The patient has no fever, night sweats, weight loss, or poor appetite.\par \par Blood work performed during her last visit showed mild lymphocytosis, normal Hb and elevated PLT > 500. SPEP/OUSMANE was negative for monoclonal protein. Quantitative IgG, IgA and IgM were within normal ranges. \par \par Today, she report she is feeling better , no longer on Prednisone and  Methotrexate  patient had rash with Methotrexate . On 3/20/18 patient had Ct of Abdomen and pelvis which reveal small Lymph node in the region of the celiac axis and also retroperitoneal area . largest lymph node measure 14 mm on the left and 11 mm on the right.On 3/27/18 patient had blood work we reviewed all blood result .  \par Also patient report mild right side back pain , she will follow up with PMD . Patient ambulating fully independent \par \par \par \par \par \par \par \par \par \par \par \par \par  [de-identified] : 10/26/18;\par The patient is here for followup visit. She has CLL and has been kept on observation. She feels fatigue but no fever, night sweat or weight loss. She had blood work on 10/19/18. CBC showed stable lymphocytosis.\par \par 4/26/19: \par Pt returns for f/u visit. No fresh complaints. Denies having fever, night sweats, weight loss, pruritus or LUQ pain. No new lumps or masses. Her CBC showed mild increase in WBC from 16K to 18K. Lymphocytic count increased from 10K to 13K. HB and plts wnl.\par \par 11/11/19\par Patient is here today for follow up visit accompanied by her friend.  She offers no new complaints.  She denies any fever, night sweats, pain, weight loss or any masses. She had recent labs from [facility] which were stable.  WBC=17.0, Hgb=12.8, Hct=38.1, Kzm=186.  Results will be scanned into [employer]. \par \par 01/06/2021: DAVION is here for follow up visit for CLL. She denies any fever, night sweats, pain, weight loss or any adenopathy  She was having throat pain; sonogram of thyroid was unremarkable. In addition, she went for a US of the head and neck. Results showed reactive adenopathy. She continues to follow with GI for constipation and abdominal pain. WBC: 22.54 Hgb:13 Plt: 378\par \par 04/05/2021: DAVION is here for follow up visit for CLL. She denies any fever, night sweats, pain, weight loss or any adenopathy. She states that she has been having episodes of increased fatigue. She was having issues of throat pain. She went for a US of the head and neck which showed reactive adenopathy. She continues to follow with an endocrinologist. CBC reviewed WBC noted to be tipping up at 25.42 and lymphocytes of 20.10; hemogram and plt normal. \par \par 10/06/2021\par DAVION is here for follow up visit for CLL. She denies any fever, night sweats, pain, or any adenopathy. She has an 18 lb unintentional weight loss, she does report under a lot of stress. She went for a [facility] which showed small and diffusely heterogenous thyroid with no focal nodule in either lobe. Small lymph nodes bilaterally, none with any worrisome features. She continues to follow with an endocrinologist. CBC reviewed WBC noted to be tipping up at 29.31 and lymphocytes of 23.0; hemogram and plt normal. \par \par 4/6/22:\seven DAVION is here for follow up visit for CLL. She complains intermittent hot flashes, feeling neck swelling and sore throat. She did not have nay fever. Her WBC has been trending up. Hb and PLT remain normal. She complained RUQ pain. CT a/p with contrast in 11/2021 showed hepatic steatosis. There was no evidence of lymphadenopathy and no splenomegaly. \par \par 10/26/22\seven RICARDO is here for follow up visit for CLL. She complains intermittent hot flashes, fatigue, and generalized arthralgia, and intermittent neck swelling. She denies fevers. WBC today is stable 31.37, Hb and PLT remain normal.\par \par 4/26/23:\seven DAVION is here for follow up visit for CLL. She complains fatigue and palpable small lymph nodes in the neck on and off. She denies fevers. WBC today is 33.6 stable, Hb and PLT remain normal.

## 2023-08-05 ENCOUNTER — EMERGENCY (EMERGENCY)
Facility: HOSPITAL | Age: 58
LOS: 0 days | Discharge: HOME | End: 2023-08-05
Attending: STUDENT IN AN ORGANIZED HEALTH CARE EDUCATION/TRAINING PROGRAM
Payer: COMMERCIAL

## 2023-08-05 VITALS
TEMPERATURE: 98 F | SYSTOLIC BLOOD PRESSURE: 147 MMHG | DIASTOLIC BLOOD PRESSURE: 70 MMHG | OXYGEN SATURATION: 100 % | RESPIRATION RATE: 18 BRPM | WEIGHT: 229.94 LBS | HEART RATE: 84 BPM

## 2023-08-05 DIAGNOSIS — Z90.49 ACQUIRED ABSENCE OF OTHER SPECIFIED PARTS OF DIGESTIVE TRACT: Chronic | ICD-10-CM
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
## 2023-08-05 DIAGNOSIS — M25.571 PAIN IN RIGHT ANKLE AND JOINTS OF RIGHT FOOT: ICD-10-CM

## 2023-08-05 DIAGNOSIS — M25.562 PAIN IN LEFT KNEE: ICD-10-CM

## 2023-08-05 DIAGNOSIS — Z88.0 ALLERGY STATUS TO PENICILLIN: ICD-10-CM

## 2023-08-05 PROCEDURE — 29515 APPLICATION SHORT LEG SPLINT: CPT | Mod: RT

## 2023-08-05 PROCEDURE — 99284 EMERGENCY DEPT VISIT MOD MDM: CPT

## 2023-08-05 PROCEDURE — 99283 EMERGENCY DEPT VISIT LOW MDM: CPT | Mod: 25

## 2023-08-05 PROCEDURE — 73610 X-RAY EXAM OF ANKLE: CPT | Mod: 26,RT

## 2023-08-05 PROCEDURE — 73610 X-RAY EXAM OF ANKLE: CPT | Mod: RT

## 2023-08-05 NOTE — ED PROVIDER NOTE - PROGRESS NOTE DETAILS
BF: no obvious fracture, splint applied, given ortho follow up. Return precautions discussed. all questions answered. Supportive care.

## 2023-08-05 NOTE — ED PROVIDER NOTE - PHYSICAL EXAMINATION
CONSTITUTIONAL:  in no acute distress.   SKIN: warm, dry  HEAD: Normocephalic; atraumatic.  EYES: PERRL, EOMI, normal sclera and conjunctiva   ENT: No nasal discharge; airway clear.  NECK: Supple; non tender.  CARD:  Regular rate and rhythm.   RESP: NO inc WOB   ABD: soft ntnd  EXT: Normal ROM.  tenderness to lateral malloulus, right, neurovascularly intake  LYMPH: No acute cervical adenopathy.  NEURO: Alert, oriented, grossly unremarkable  PSYCH: Cooperative, appropriate.

## 2023-08-05 NOTE — ED PROVIDER NOTE - OBJECTIVE STATEMENT
58-year-old female no past medical history coming in here for ankle pain/fall.  Patient tripped over uneven footing and fell onto her side.  Patient has a tenderness to right ankle and abrasion over left knee.  Denies any LOC head trauma or AC.  No other complaints

## 2023-08-05 NOTE — ED PROVIDER NOTE - CLINICAL SUMMARY MEDICAL DECISION MAKING FREE TEXT BOX
58-year-old female no past medical history who presents to the ED complaining of right ankle pain after a mechanical trip and fall or even rebecca.  Patient fell onto her side, denies any LOC, no head trauma, no anticoagulation.  No focal weakness or numbness or tingling.  Patient has been able to ambulate since the injury but has had worsening pain to the foot since then.    On exam, vital signs within normal limits.  Patient is well-appearing.  Head is normocephalic and atraumatic.  No C or T or L-spine tenderness palpation.  Heart is regular rate and rhythm, lungs are clear auscultation bilaterally.  Abdomen soft, nontender, nondistended.  The right ankle has tenderness palpation over the posterior bilateral malleoli, no tenderness to palpation over the base of the fifth metatarsal or over the navicular.  Normal pulses, some edema overlying the malleoli bilaterally.  Normal sensation, normal range of motion, normal strength    Fracture versus sprain, will obtain x-ray.

## 2023-08-05 NOTE — ED PROVIDER NOTE - PATIENT PORTAL LINK FT
You can access the FollowMyHealth Patient Portal offered by [facility] by registering at the following website: [email or web address]. By joining [facility]’s FollowMyHealth portal, you will also be able to view your health information using other applications (apps) compatible with our system.

## 2023-08-05 NOTE — ED PROVIDER NOTE - CCCP TRG CHIEF CMPLNT
Could we pls check with pt to see if 3 pm on 6/29 would work? Thank you.    knee pain/ankle pain/injury

## 2023-08-05 NOTE — ED PROVIDER NOTE - WET READ LAUNCH FT
Med rec done with pt mother interview There is 1 Wet Read(s) to document. There are no Wet Read(s) to document.

## 2023-08-07 ENCOUNTER — APPOINTMENT (OUTPATIENT)
Dept: ORTHOPEDIC SURGERY | Facility: CLINIC | Age: 58
End: 2023-08-07
Payer: COMMERCIAL

## 2023-08-07 DIAGNOSIS — S93.491A SPRAIN OF OTHER LIGAMENT OF RIGHT ANKLE, INITIAL ENCOUNTER: ICD-10-CM

## 2023-08-07 PROCEDURE — 99203 OFFICE O/P NEW LOW 30 MIN: CPT

## 2023-08-07 PROCEDURE — L4361: CPT | Mod: RT

## 2023-08-07 NOTE — HISTORY OF PRESENT ILLNESS
[de-identified] : 58-year-old female comes in today for an evaluation of her right foot and ankle pain and injury that occurred on August 5.  Patient states that she had a fall on Saturday she twisted her ankle between the grass and the pavers went to stand on hospital placed into a splint and given crutches.  She was unable to use the crutches she is currently using a wheelchair

## 2023-08-07 NOTE — ASSESSMENT
[FreeTextEntry1] : Patient was placed into a tall cam walker boot can be weightbearing as tolerated.  Icing, anti-inflammatory, elevation.  Patient will follow-up in 2 weeks in our foot and ankle department for further evaluation and treatment

## 2023-08-14 ENCOUNTER — OUTPATIENT (OUTPATIENT)
Dept: OUTPATIENT SERVICES | Facility: HOSPITAL | Age: 58
LOS: 1 days | End: 2023-08-14
Payer: COMMERCIAL

## 2023-08-14 DIAGNOSIS — Z90.49 ACQUIRED ABSENCE OF OTHER SPECIFIED PARTS OF DIGESTIVE TRACT: Chronic | ICD-10-CM

## 2023-08-14 DIAGNOSIS — K02.9 DENTAL CARIES, UNSPECIFIED: ICD-10-CM

## 2023-08-14 PROCEDURE — D0140: CPT

## 2023-08-14 PROCEDURE — D0220: CPT

## 2023-08-21 DIAGNOSIS — K03.81 CRACKED TOOTH: ICD-10-CM

## 2023-08-28 ENCOUNTER — APPOINTMENT (OUTPATIENT)
Dept: ORTHOPEDIC SURGERY | Facility: CLINIC | Age: 58
End: 2023-08-28

## 2023-10-11 ENCOUNTER — LABORATORY RESULT (OUTPATIENT)
Age: 58
End: 2023-10-11

## 2023-10-11 ENCOUNTER — OUTPATIENT (OUTPATIENT)
Dept: OUTPATIENT SERVICES | Facility: HOSPITAL | Age: 58
LOS: 1 days | End: 2023-10-11
Payer: COMMERCIAL

## 2023-10-11 ENCOUNTER — APPOINTMENT (OUTPATIENT)
Dept: HEMATOLOGY ONCOLOGY | Facility: CLINIC | Age: 58
End: 2023-10-11
Payer: COMMERCIAL

## 2023-10-11 VITALS
SYSTOLIC BLOOD PRESSURE: 138 MMHG | DIASTOLIC BLOOD PRESSURE: 63 MMHG | RESPIRATION RATE: 19 BRPM | HEIGHT: 68 IN | HEART RATE: 82 BPM | BODY MASS INDEX: 34.86 KG/M2 | TEMPERATURE: 98.6 F | WEIGHT: 230 LBS | OXYGEN SATURATION: 99 %

## 2023-10-11 DIAGNOSIS — C91.10 CHRONIC LYMPHOCYTIC LEUKEMIA OF B-CELL TYPE NOT HAVING ACHIEVED REMISSION: ICD-10-CM

## 2023-10-11 DIAGNOSIS — Z90.49 ACQUIRED ABSENCE OF OTHER SPECIFIED PARTS OF DIGESTIVE TRACT: Chronic | ICD-10-CM

## 2023-10-11 LAB
ALBUMIN SERPL ELPH-MCNC: 4.7 G/DL
ALP BLD-CCNC: 135 U/L
ALT SERPL-CCNC: 23 U/L
ANION GAP SERPL CALC-SCNC: 13 MMOL/L
AST SERPL-CCNC: 20 U/L
BILIRUB DIRECT SERPL-MCNC: <0.2 MG/DL
BILIRUB INDIRECT SERPL-MCNC: >0.1 MG/DL
BILIRUB SERPL-MCNC: 0.3 MG/DL
BUN SERPL-MCNC: 16 MG/DL
CALCIUM SERPL-MCNC: 9.3 MG/DL
CHLORIDE SERPL-SCNC: 103 MMOL/L
CO2 SERPL-SCNC: 24 MMOL/L
CREAT SERPL-MCNC: 0.8 MG/DL
EGFR: 86 ML/MIN/1.73M2
GLUCOSE SERPL-MCNC: 136 MG/DL
HCT VFR BLD CALC: 38.2 %
HGB BLD-MCNC: 12.3 G/DL
LDH SERPL-CCNC: 191
MCHC RBC-ENTMCNC: 30 PG
MCHC RBC-ENTMCNC: 32.2 G/DL
MCV RBC AUTO: 93.2 FL
PLATELET # BLD AUTO: 322 K/UL
PMV BLD: 8.8 FL
POTASSIUM SERPL-SCNC: 4.5 MMOL/L
PROT SERPL-MCNC: 7.3 G/DL
RBC # BLD: 4.1 M/UL
RBC # FLD: 13.1 %
SODIUM SERPL-SCNC: 140 MMOL/L
WBC # FLD AUTO: 32.46 K/UL

## 2023-10-11 PROCEDURE — 88280 CHROMOSOME KARYOTYPE STUDY: CPT

## 2023-10-11 PROCEDURE — 99213 OFFICE O/P EST LOW 20 MIN: CPT

## 2023-10-11 PROCEDURE — 88275 CYTOGENETICS 100-300: CPT

## 2023-10-11 PROCEDURE — 88264 CHROMOSOME ANALYSIS 20-25: CPT

## 2023-10-11 PROCEDURE — 80076 HEPATIC FUNCTION PANEL: CPT

## 2023-10-11 PROCEDURE — 85027 COMPLETE CBC AUTOMATED: CPT

## 2023-10-11 PROCEDURE — 88271 CYTOGENETICS DNA PROBE: CPT

## 2023-10-11 PROCEDURE — 81263 IGH VARI REGIONAL MUTATION: CPT

## 2023-10-11 PROCEDURE — 83615 LACTATE (LD) (LDH) ENZYME: CPT

## 2023-10-11 PROCEDURE — 88237 TISSUE CULTURE BONE MARROW: CPT

## 2023-10-11 PROCEDURE — 80048 BASIC METABOLIC PNL TOTAL CA: CPT

## 2023-10-19 LAB
IGVH FAMILY: NORMAL
IGVH MUTATION ANALYSIS: POSITIVE
MUTATION RATE: NORMAL

## 2024-02-16 ENCOUNTER — NON-APPOINTMENT (OUTPATIENT)
Age: 59
End: 2024-02-16

## 2024-04-02 ENCOUNTER — LABORATORY RESULT (OUTPATIENT)
Age: 59
End: 2024-04-02

## 2024-04-02 ENCOUNTER — OUTPATIENT (OUTPATIENT)
Dept: OUTPATIENT SERVICES | Facility: HOSPITAL | Age: 59
LOS: 1 days | End: 2024-04-02
Payer: COMMERCIAL

## 2024-04-02 ENCOUNTER — APPOINTMENT (OUTPATIENT)
Age: 59
End: 2024-04-02
Payer: COMMERCIAL

## 2024-04-02 VITALS
BODY MASS INDEX: 34.1 KG/M2 | SYSTOLIC BLOOD PRESSURE: 152 MMHG | TEMPERATURE: 97.6 F | RESPIRATION RATE: 16 BRPM | WEIGHT: 225 LBS | HEART RATE: 88 BPM | OXYGEN SATURATION: 99 % | DIASTOLIC BLOOD PRESSURE: 84 MMHG | HEIGHT: 68 IN

## 2024-04-02 DIAGNOSIS — C91.10 CHRONIC LYMPHOCYTIC LEUKEMIA OF B-CELL TYPE NOT HAVING ACHIEVED REMISSION: ICD-10-CM

## 2024-04-02 DIAGNOSIS — Z90.49 ACQUIRED ABSENCE OF OTHER SPECIFIED PARTS OF DIGESTIVE TRACT: Chronic | ICD-10-CM

## 2024-04-02 LAB
ALBUMIN SERPL ELPH-MCNC: 4.7 G/DL
ALP BLD-CCNC: 135 U/L
ALT SERPL-CCNC: 18 U/L
ANION GAP SERPL CALC-SCNC: 13 MMOL/L
AST SERPL-CCNC: 18 U/L
BILIRUB DIRECT SERPL-MCNC: <0.2 MG/DL
BILIRUB INDIRECT SERPL-MCNC: >0.1 MG/DL
BILIRUB SERPL-MCNC: 0.3 MG/DL
BUN SERPL-MCNC: 16 MG/DL
CALCIUM SERPL-MCNC: 9.8 MG/DL
CHLORIDE SERPL-SCNC: 105 MMOL/L
CO2 SERPL-SCNC: 25 MMOL/L
CREAT SERPL-MCNC: 0.7 MG/DL
EGFR: 101 ML/MIN/1.73M2
GLUCOSE SERPL-MCNC: 109 MG/DL
HCT VFR BLD CALC: 40 %
HGB BLD-MCNC: 12.7 G/DL
LDH SERPL-CCNC: 194
MCHC RBC-ENTMCNC: 29.7 PG
MCHC RBC-ENTMCNC: 31.8 G/DL
MCV RBC AUTO: 93.5 FL
PLATELET # BLD AUTO: 337 K/UL
PMV BLD: 8.8 FL
POTASSIUM SERPL-SCNC: 4.6 MMOL/L
PROT SERPL-MCNC: 7.6 G/DL
RBC # BLD: 4.28 M/UL
RBC # FLD: 13.6 %
SODIUM SERPL-SCNC: 143 MMOL/L
WBC # FLD AUTO: 31.63 K/UL

## 2024-04-02 PROCEDURE — 99214 OFFICE O/P EST MOD 30 MIN: CPT

## 2024-04-02 PROCEDURE — 85027 COMPLETE CBC AUTOMATED: CPT

## 2024-04-02 PROCEDURE — 80076 HEPATIC FUNCTION PANEL: CPT

## 2024-04-02 PROCEDURE — 83615 LACTATE (LD) (LDH) ENZYME: CPT

## 2024-04-02 PROCEDURE — 80048 BASIC METABOLIC PNL TOTAL CA: CPT

## 2024-04-02 NOTE — ASSESSMENT
[FreeTextEntry1] : Chronic lymphocytic leukemia with lymphocytosis.  Assessment and Plan: - Blood work results reviewed. CBC shows stable lymphocytosis. Hb and PLT are normal.  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 Both are a favorable prognostic indicator in chronic lymphocytic leukemia. - She is asymptomatic. Continue observation. - Order blood work today: BMP, LFT, LDH. - Continue to f/u with rheumatology for her RA. - F/u with PCP, gyne and gI for health maintenance.  RTC in 6 months.  Case was seen and discussed with Dr. Brunson who agreed with the assessment and plan.

## 2024-04-02 NOTE — CONSULT LETTER
[Dear  ___] : Dear  [unfilled], [Courtesy Letter:] : I had the pleasure of seeing your patient, [unfilled], in my office today. [Please see my note below.] : Please see my note below. [Sincerely,] : Sincerely, [FreeTextEntry3] : Shelli Brunson MD

## 2024-04-02 NOTE — HISTORY OF PRESENT ILLNESS
[de-identified] : 52-year-old female is here today for 6 months f/u visit. She has chronic lymphocytic leukemia. She saw Dr. Wesly Barr and had a blood workup in 12/2016.  CBC shows WBC 13.4 with 54% lymphocytes and absolute lymphocyte counts of 7276.  Hemoglobin was 13.3 g/dL and platelet counts 431. Her electrolytes and renal function were within normal limits.  On 12/29/16, flow cytometry of peripheral blood performed at [email or web address] lab revealed small mature lymphocytes positive for CD5, CD19, CD20 (dim), CD23, HLL-DR, CD11c and surface lambda light chain restriction (dim signal). These B cells were negative for CD10 and CD38. 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  On January 4, 2016, she had CT of the abdomen and pelvis [facility] contrast [facility], which showed fatty liver. The spleen was not enlarged. The patient has no fever, night sweats, weight loss, or poor appetite.\par  \par  Blood work performed during her last visit showed mild lymphocytosis, normal Hb and elevated PLT > 500. SPEP/OUSMANE was negative for monoclonal protein. Quantitative IgG, IgA and IgM were within normal ranges. \par  \par  Today, she report she is feeling better , no longer on Prednisone and  Methotrexate  patient had rash with Methotrexate . On 3/20/18 patient had Ct of Abdomen and pelvis which reveal small Lymph node in the region of the celiac axis and also retroperitoneal area . largest lymph node measure 14 mm on the left and 11 mm on the right.On 3/27/18 patient had blood work we reviewed all blood result .  \par  Also patient report mild right side back pain , she will follow up with PMD . Patient ambulating fully independent \par  \par  \par  \par  \par  \par  \par  \par  \par  \par  \par  \par  \par   [de-identified] : 10/26/18; The patient is here for followup visit. She has CLL and has been kept on observation. She feels fatigue but no fever, night sweat or weight loss. She had blood work on 10/19/18. CBC showed stable lymphocytosis.  4/26/19:  Pt returns for f/u visit. No fresh complaints. Denies having fever, night sweats, weight loss, pruritus or LUQ pain. No new lumps or masses. Her CBC showed mild increase in WBC from 16K to 18K. Lymphocytic count increased from 10K to 13K. HB and plts wnl.  11/11/19 Patient is here today for follow up visit accompanied by her friend.  She offers no new complaints.  She denies any fever, night sweats, pain, weight loss or any masses. She had recent labs from [facility] which were stable.  WBC=17.0, Hgb=12.8, Hct=38.1, Mob=047.  Results will be scanned into [employer].   01/06/2021: DAVION is here for follow up visit for CLL. She denies any fever, night sweats, pain, weight loss or any adenopathy  She was having throat pain; sonogram of thyroid was unremarkable. In addition, she went for a US of the head and neck. Results showed reactive adenopathy. She continues to follow with GI for constipation and abdominal pain. WBC: 22.54 Hgb:13 Plt: 378  04/05/2021: DAVION is here for follow up visit for CLL. She denies any fever, night sweats, pain, weight loss or any adenopathy. She states that she has been having episodes of increased fatigue. She was having issues of throat pain. She went for a US of the head and neck which showed reactive adenopathy. She continues to follow with an endocrinologist. CBC reviewed WBC noted to be tipping up at 25.42 and lymphocytes of 20.10; hemogram and plt normal.   10/06/2021 DAVION is here for follow up visit for CLL. She denies any fever, night sweats, pain, or any adenopathy. She has an 18 lb unintentional weight loss, she does report under a lot of stress. She went for a US of head and neck on 9/23 at [facility] which showed small and diffusely heterogenous thyroid with no focal nodule in either lobe. Small lymph nodes bilaterally, none with any worrisome features. She continues to follow with an endocrinologist. CBC reviewed WBC noted to be tipping up at 29.31 and lymphocytes of 23.0; hemogram and plt normal.   4/6/22: DAVION is here for follow up visit for CLL. She complains intermittent hot flashes, feeling neck swelling and sore throat. She did not have nay fever. Her WBC has been trending up. Hb and PLT remain normal. She complained RUQ pain. CT a/p with contrast in 11/2021 showed hepatic steatosis. There was no evidence of lymphadenopathy and no splenomegaly.   10/26/22 DAVION is here for follow up visit for CLL. She complains intermittent hot flashes, fatigue, and generalized arthralgia, and intermittent neck swelling. She denies fevers. WBC today is stable 31.37, Hb and PLT remain normal.  4/26/23: DAVION is here for follow up visit for CLL. She complains fatigue and palpable small lymph nodes in the neck on and off. She denies fevers. WBC today is 33.6 stable, Hb and PLT remain normal.WBC today is 33.6 stable, Hb and PLT remain normal.  10/11/23 DAVION is here for follow up visit for CLL. She complains  intermittent hot flashes, fatigue, and generalized arthralgia, and intermittent neck swelling. She denies fevers, chills, and unintentional weight loss. She denies fevers. WBC today is 32.46 stable, Hb and PLT remain normal.  4/2/24 DAVION is here for follow up visit for CLL. She complains  intermittent hot flashes, fatigue, and generalized arthralgia, and intermittent neck swelling. She denies fevers, chills, and unintentional weight loss. She was recently started on Leflunomide by rheum for her RA. She had cellulitis on her left face 2 weeks ago, s/p Doxycycline with good response.

## 2024-04-02 NOTE — PHYSICAL EXAM
[Fully active, able to carry on all pre-disease performance without restriction] : Status 0 - Fully active, able to carry on all pre-disease performance without restriction [Normal] : affect appropriate [de-identified] : There is no palpable cervical lymphadenopathy.

## 2024-04-03 DIAGNOSIS — C91.10 CHRONIC LYMPHOCYTIC LEUKEMIA OF B-CELL TYPE NOT HAVING ACHIEVED REMISSION: ICD-10-CM

## 2024-04-22 ENCOUNTER — APPOINTMENT (OUTPATIENT)
Dept: HEMATOLOGY ONCOLOGY | Facility: CLINIC | Age: 59
End: 2024-04-22

## 2024-10-07 ENCOUNTER — OUTPATIENT (OUTPATIENT)
Dept: OUTPATIENT SERVICES | Facility: HOSPITAL | Age: 59
LOS: 1 days | Discharge: HOME | End: 2024-10-07
Payer: MEDICAID

## 2024-10-07 ENCOUNTER — LABORATORY RESULT (OUTPATIENT)
Age: 59
End: 2024-10-07

## 2024-10-07 ENCOUNTER — APPOINTMENT (OUTPATIENT)
Age: 59
End: 2024-10-07
Payer: MEDICAID

## 2024-10-07 VITALS
DIASTOLIC BLOOD PRESSURE: 80 MMHG | HEIGHT: 68 IN | HEART RATE: 84 BPM | OXYGEN SATURATION: 99 % | SYSTOLIC BLOOD PRESSURE: 144 MMHG | RESPIRATION RATE: 18 BRPM | TEMPERATURE: 98 F

## 2024-10-07 DIAGNOSIS — C91.10 CHRONIC LYMPHOCYTIC LEUKEMIA OF B-CELL TYPE NOT HAVING ACHIEVED REMISSION: ICD-10-CM

## 2024-10-07 DIAGNOSIS — Z90.49 ACQUIRED ABSENCE OF OTHER SPECIFIED PARTS OF DIGESTIVE TRACT: Chronic | ICD-10-CM

## 2024-10-07 LAB
ALBUMIN SERPL ELPH-MCNC: 4.8 G/DL
ALP BLD-CCNC: 147 U/L
ALT SERPL-CCNC: 23 U/L
ANION GAP SERPL CALC-SCNC: 13 MMOL/L
AST SERPL-CCNC: 22 U/L
BILIRUB DIRECT SERPL-MCNC: <0.2 MG/DL
BILIRUB INDIRECT SERPL-MCNC: >0.2 MG/DL
BILIRUB SERPL-MCNC: 0.4 MG/DL
BUN SERPL-MCNC: 11 MG/DL
CALCIUM SERPL-MCNC: 9.3 MG/DL
CHLORIDE SERPL-SCNC: 103 MMOL/L
CO2 SERPL-SCNC: 25 MMOL/L
CREAT SERPL-MCNC: 0.9 MG/DL
EGFR: 74 ML/MIN/1.73M2
GLUCOSE SERPL-MCNC: 86 MG/DL
HCT VFR BLD CALC: 37.7 %
HGB BLD-MCNC: 12.5 G/DL
MCHC RBC-ENTMCNC: 30.3 PG
MCHC RBC-ENTMCNC: 33.2 G/DL
MCV RBC AUTO: 91.3 FL
PLATELET # BLD AUTO: 326 K/UL
PMV BLD: 8.8 FL
POTASSIUM SERPL-SCNC: 4.3 MMOL/L
PROT SERPL-MCNC: 7.1 G/DL
RBC # BLD: 4.13 M/UL
RBC # FLD: 13.2 %
SODIUM SERPL-SCNC: 141 MMOL/L
WBC # FLD AUTO: 30.01 K/UL

## 2024-10-07 PROCEDURE — 99214 OFFICE O/P EST MOD 30 MIN: CPT

## 2024-10-07 PROCEDURE — 80048 BASIC METABOLIC PNL TOTAL CA: CPT

## 2024-10-07 PROCEDURE — 80076 HEPATIC FUNCTION PANEL: CPT

## 2024-10-07 PROCEDURE — 85027 COMPLETE CBC AUTOMATED: CPT

## 2024-10-08 DIAGNOSIS — C91.10 CHRONIC LYMPHOCYTIC LEUKEMIA OF B-CELL TYPE NOT HAVING ACHIEVED REMISSION: ICD-10-CM

## 2024-10-15 ENCOUNTER — APPOINTMENT (OUTPATIENT)
Dept: UROLOGY | Facility: CLINIC | Age: 59
End: 2024-10-15
Payer: MEDICAID

## 2024-10-15 ENCOUNTER — LABORATORY RESULT (OUTPATIENT)
Age: 59
End: 2024-10-15

## 2024-10-15 DIAGNOSIS — N32.81 OVERACTIVE BLADDER: ICD-10-CM

## 2024-10-15 DIAGNOSIS — R31.0 GROSS HEMATURIA: ICD-10-CM

## 2024-10-15 DIAGNOSIS — Z78.9 OTHER SPECIFIED HEALTH STATUS: ICD-10-CM

## 2024-10-15 PROCEDURE — G2211 COMPLEX E/M VISIT ADD ON: CPT | Mod: NC

## 2024-10-15 PROCEDURE — 99204 OFFICE O/P NEW MOD 45 MIN: CPT

## 2024-10-15 RX ORDER — ABATACEPT 125 MG/ML
125 INJECTION, SOLUTION SUBCUTANEOUS
Refills: 0 | Status: ACTIVE | COMMUNITY

## 2024-10-15 RX ORDER — ESOMEPRAZOLE MAGNESIUM 40 MG/1
40 FOR SUSPENSION ORAL
Refills: 0 | Status: ACTIVE | COMMUNITY

## 2024-10-18 ENCOUNTER — NON-APPOINTMENT (OUTPATIENT)
Age: 59
End: 2024-10-18

## 2024-10-18 LAB
BACTERIA UR CULT: NORMAL
URINE CYTOLOGY: NORMAL

## 2024-11-14 ENCOUNTER — APPOINTMENT (OUTPATIENT)
Dept: UROLOGY | Facility: CLINIC | Age: 59
End: 2024-11-14
Payer: MEDICAID

## 2024-11-14 DIAGNOSIS — N32.81 OVERACTIVE BLADDER: ICD-10-CM

## 2024-11-14 DIAGNOSIS — R31.0 GROSS HEMATURIA: ICD-10-CM

## 2024-11-14 PROCEDURE — 52000 CYSTOURETHROSCOPY: CPT

## 2024-11-14 PROCEDURE — 99214 OFFICE O/P EST MOD 30 MIN: CPT | Mod: 25

## 2024-11-14 RX ORDER — MIRABEGRON 25 MG/1
25 TABLET, EXTENDED RELEASE ORAL
Qty: 90 | Refills: 3 | Status: ACTIVE | COMMUNITY
Start: 2024-11-14 | End: 1900-01-01

## 2024-11-18 ENCOUNTER — NON-APPOINTMENT (OUTPATIENT)
Age: 59
End: 2024-11-18

## 2024-11-18 LAB
BILIRUB UR QL STRIP: NORMAL
COLLECTION METHOD: NORMAL
GLUCOSE UR-MCNC: NORMAL
HCG UR QL: 0.2 EU/DL
HGB UR QL STRIP.AUTO: NORMAL
KETONES UR-MCNC: NORMAL
LEUKOCYTE ESTERASE UR QL STRIP: NORMAL
NITRITE UR QL STRIP: NORMAL
PH UR STRIP: 5.5
PROT UR STRIP-MCNC: NORMAL
SP GR UR STRIP: 1.02

## 2025-01-06 ENCOUNTER — APPOINTMENT (OUTPATIENT)
Dept: ORTHOPEDIC SURGERY | Facility: CLINIC | Age: 60
End: 2025-01-06
Payer: MEDICAID

## 2025-01-06 VITALS — BODY MASS INDEX: 35.31 KG/M2 | WEIGHT: 225 LBS | HEIGHT: 67 IN

## 2025-01-06 DIAGNOSIS — M71.359: ICD-10-CM

## 2025-01-06 DIAGNOSIS — M17.11 UNILATERAL PRIMARY OSTEOARTHRITIS, RIGHT KNEE: ICD-10-CM

## 2025-01-06 DIAGNOSIS — M05.79 RHEUMATOID ARTHRITIS WITH RHEUMATOID FACTOR OF MULTIPLE SITES W/OUT ORGAN OR SYSTEMS INVOLVEMENT: ICD-10-CM

## 2025-01-06 DIAGNOSIS — M46.1 SACROILIITIS, NOT ELSEWHERE CLASSIFIED: ICD-10-CM

## 2025-01-06 PROCEDURE — 73522 X-RAY EXAM HIPS BI 3-4 VIEWS: CPT

## 2025-01-06 PROCEDURE — 99203 OFFICE O/P NEW LOW 30 MIN: CPT

## 2025-01-06 PROCEDURE — 73564 X-RAY EXAM KNEE 4 OR MORE: CPT | Mod: 50

## 2025-03-01 ENCOUNTER — OUTPATIENT (OUTPATIENT)
Dept: OUTPATIENT SERVICES | Facility: HOSPITAL | Age: 60
LOS: 1 days | End: 2025-03-01
Payer: MEDICAID

## 2025-03-01 ENCOUNTER — RESULT REVIEW (OUTPATIENT)
Age: 60
End: 2025-03-01

## 2025-03-01 DIAGNOSIS — Z90.49 ACQUIRED ABSENCE OF OTHER SPECIFIED PARTS OF DIGESTIVE TRACT: Chronic | ICD-10-CM

## 2025-03-01 DIAGNOSIS — Z00.8 ENCOUNTER FOR OTHER GENERAL EXAMINATION: ICD-10-CM

## 2025-03-01 DIAGNOSIS — M25.551 PAIN IN RIGHT HIP: ICD-10-CM

## 2025-03-01 PROCEDURE — 73700 CT LOWER EXTREMITY W/O DYE: CPT | Mod: RT

## 2025-03-01 PROCEDURE — 73700 CT LOWER EXTREMITY W/O DYE: CPT | Mod: 26,RT

## 2025-03-02 DIAGNOSIS — M25.551 PAIN IN RIGHT HIP: ICD-10-CM

## 2025-03-18 ENCOUNTER — APPOINTMENT (OUTPATIENT)
Dept: UROLOGY | Facility: CLINIC | Age: 60
End: 2025-03-18
Payer: MEDICAID

## 2025-03-18 VITALS
OXYGEN SATURATION: 98 % | BODY MASS INDEX: 35.31 KG/M2 | HEART RATE: 79 BPM | SYSTOLIC BLOOD PRESSURE: 132 MMHG | DIASTOLIC BLOOD PRESSURE: 81 MMHG | HEIGHT: 67 IN | WEIGHT: 225 LBS | RESPIRATION RATE: 17 BRPM

## 2025-03-18 DIAGNOSIS — N32.81 OVERACTIVE BLADDER: ICD-10-CM

## 2025-03-18 DIAGNOSIS — R31.0 GROSS HEMATURIA: ICD-10-CM

## 2025-03-18 PROCEDURE — G2211 COMPLEX E/M VISIT ADD ON: CPT | Mod: NC

## 2025-03-18 PROCEDURE — 99214 OFFICE O/P EST MOD 30 MIN: CPT

## 2025-03-20 ENCOUNTER — NON-APPOINTMENT (OUTPATIENT)
Age: 60
End: 2025-03-20

## 2025-03-20 LAB
BILIRUB UR QL STRIP: NORMAL
COLLECTION METHOD: NORMAL
GLUCOSE UR-MCNC: NORMAL
HCG UR QL: 0.2 EU/DL
HGB UR QL STRIP.AUTO: NORMAL
KETONES UR-MCNC: NORMAL
LEUKOCYTE ESTERASE UR QL STRIP: NORMAL
NITRITE UR QL STRIP: NORMAL
PH UR STRIP: 6.5
PROT UR STRIP-MCNC: NORMAL
SP GR UR STRIP: 1.01

## 2025-03-21 ENCOUNTER — APPOINTMENT (OUTPATIENT)
Dept: ORTHOPEDIC SURGERY | Facility: CLINIC | Age: 60
End: 2025-03-21

## 2025-04-17 ENCOUNTER — OUTPATIENT (OUTPATIENT)
Dept: OUTPATIENT SERVICES | Facility: HOSPITAL | Age: 60
LOS: 1 days | End: 2025-04-17
Payer: MEDICAID

## 2025-04-17 ENCOUNTER — LABORATORY RESULT (OUTPATIENT)
Age: 60
End: 2025-04-17

## 2025-04-17 ENCOUNTER — APPOINTMENT (OUTPATIENT)
Age: 60
End: 2025-04-17
Payer: MEDICAID

## 2025-04-17 VITALS
SYSTOLIC BLOOD PRESSURE: 138 MMHG | WEIGHT: 216 LBS | TEMPERATURE: 98.6 F | BODY MASS INDEX: 33.9 KG/M2 | RESPIRATION RATE: 19 BRPM | OXYGEN SATURATION: 99 % | HEART RATE: 109 BPM | DIASTOLIC BLOOD PRESSURE: 84 MMHG | HEIGHT: 67 IN

## 2025-04-17 VITALS
HEIGHT: 67 IN | RESPIRATION RATE: 17 BRPM | OXYGEN SATURATION: 100 % | BODY MASS INDEX: 33.9 KG/M2 | WEIGHT: 216 LBS | TEMPERATURE: 98.3 F | SYSTOLIC BLOOD PRESSURE: 136 MMHG | DIASTOLIC BLOOD PRESSURE: 84 MMHG | HEART RATE: 87 BPM

## 2025-04-17 DIAGNOSIS — C91.10 CHRONIC LYMPHOCYTIC LEUKEMIA OF B-CELL TYPE NOT HAVING ACHIEVED REMISSION: ICD-10-CM

## 2025-04-17 DIAGNOSIS — Z90.49 ACQUIRED ABSENCE OF OTHER SPECIFIED PARTS OF DIGESTIVE TRACT: Chronic | ICD-10-CM

## 2025-04-17 LAB
ALBUMIN SERPL ELPH-MCNC: 4.4 G/DL
ALP BLD-CCNC: 155 U/L
ALT SERPL-CCNC: 23 U/L
ANION GAP SERPL CALC-SCNC: 10 MMOL/L
AST SERPL-CCNC: 23 U/L
AUTO BASOPHILS #: 0.09 K/UL
AUTO BASOPHILS %: 0.3 %
AUTO EOSINOPHILS #: 0.1 K/UL
AUTO EOSINOPHILS %: 0.3 %
AUTO IMMATURE GRANULOCYTES #: 0.04 K/UL
AUTO LYMPHOCYTES #: 26.45 K/UL
AUTO LYMPHOCYTES %: 85.4 %
AUTO MONOCYTES #: 0.64 K/UL
AUTO MONOCYTES %: 2.1 %
AUTO NEUTROPHILS #: 3.67 K/UL
AUTO NEUTROPHILS %: 11.8 %
AUTO NRBC #: 0 K/UL
BILIRUB SERPL-MCNC: 0.3 MG/DL
BUN SERPL-MCNC: 15 MG/DL
CALCIUM SERPL-MCNC: 9.3 MG/DL
CHLORIDE SERPL-SCNC: 104 MMOL/L
CO2 SERPL-SCNC: 26 MMOL/L
CREAT SERPL-MCNC: 0.8 MG/DL
EGFRCR SERPLBLD CKD-EPI 2021: 85 ML/MIN/1.73M2
GLUCOSE SERPL-MCNC: 107 MG/DL
HCT VFR BLD CALC: 35.7 %
HGB BLD-MCNC: 11.5 G/DL
IMM GRANULOCYTES NFR BLD AUTO: 0.1 %
MAN DIFF?: NORMAL
MCHC RBC-ENTMCNC: 29.1 PG
MCHC RBC-ENTMCNC: 32.2 G/DL
MCV RBC AUTO: 90.4 FL
PLATELET # BLD AUTO: 305 K/UL
PMV BLD AUTO: 0 /100 WBCS
PMV BLD: 8.9 FL
POTASSIUM SERPL-SCNC: 4.2 MMOL/L
PROT SERPL-MCNC: 7.1 G/DL
RBC # BLD: 3.95 M/UL
RBC # FLD: 13.3 %
SODIUM SERPL-SCNC: 140 MMOL/L
WBC # FLD AUTO: 30.99 K/UL

## 2025-04-17 PROCEDURE — 85025 COMPLETE CBC W/AUTO DIFF WBC: CPT

## 2025-04-17 PROCEDURE — 99214 OFFICE O/P EST MOD 30 MIN: CPT

## 2025-04-17 PROCEDURE — 80053 COMPREHEN METABOLIC PANEL: CPT

## 2025-04-17 PROCEDURE — 82248 BILIRUBIN DIRECT: CPT

## 2025-04-18 DIAGNOSIS — C91.10 CHRONIC LYMPHOCYTIC LEUKEMIA OF B-CELL TYPE NOT HAVING ACHIEVED REMISSION: ICD-10-CM

## 2025-06-12 NOTE — ED PROVIDER NOTE - MDM ORDERS SUBMITTED SELECTION
Therapeutic paracentesis. Removed 5100ml clear yellow fluid, RLQ. Patient tolerated the procedure well. Exofin was put on the site. Patient did not want AVS.   
Medications